# Patient Record
Sex: FEMALE | Race: WHITE | URBAN - METROPOLITAN AREA
[De-identification: names, ages, dates, MRNs, and addresses within clinical notes are randomized per-mention and may not be internally consistent; named-entity substitution may affect disease eponyms.]

---

## 2017-02-02 ENCOUNTER — ANESTHESIA EVENT (OUTPATIENT)
Dept: SURGERY | Facility: CLINIC | Age: 72
DRG: 472 | End: 2017-02-02
Payer: MEDICARE

## 2017-02-02 RX ORDER — HYDROCODONE BITARTRATE AND ACETAMINOPHEN 5; 325 MG/1; MG/1
1 TABLET ORAL EVERY 4 HOURS PRN
COMMUNITY

## 2017-02-02 RX ORDER — ALBUTEROL SULFATE 90 UG/1
1-2 AEROSOL, METERED RESPIRATORY (INHALATION) EVERY 4 HOURS PRN
COMMUNITY

## 2017-02-02 RX ORDER — VERAPAMIL HYDROCHLORIDE 180 MG/1
180 TABLET, EXTENDED RELEASE ORAL DAILY
COMMUNITY

## 2017-02-02 NOTE — PHARMACY-ADMISSION MEDICATION HISTORY
Admission medication history interview status for this patient is complete. See [facility] admission navigator for allergy information, prior to admission medications and immunization status.     PTA med list completed by pre-admitting nurse,  Leticia Silverio RN u Feb 2, 2017  3:10 PM       Prior to Admission medications    Medication Sig Last Dose Taking? Auth Provider   HYDROcodone-acetaminophen (NORCO) 5-325 MG per tablet Take 1 tablet by mouth every 4 hours as needed for moderate to severe pain  Yes Reported, Patient   Naproxen (NAPROSYN PO) Take 500 mg by mouth 2 times daily (with meals)  Yes Reported, Patient   albuterol (PROAIR HFA/PROVENTIL HFA/VENTOLIN HFA) 108 (90 BASE) MCG/ACT Inhaler Inhale 1-2 puffs into the lungs every 4 hours as needed for shortness of breath / dyspnea or wheezing  Yes Reported, Patient   CARVEDILOL PO Take 80 mg by mouth daily  Yes Reported, Patient   Enalapril Maleate (VASOTEC PO) Take 20 mg by mouth 2 times daily  Yes Reported, Patient   sitagliptin-metFORMIN (JANUMET)  MG per tablet Take 1 tablet by mouth 2 times daily (with meals)  Yes Reported, Patient   verapamil (CALAN-SR) 180 MG CR tablet Take 180 mg by mouth daily  Yes Reported, Patient   OMEPRAZOLE PO Take 40 mg by mouth every morning  Yes Reported, Patient   ASPIRIN PO Take 81 mg by mouth daily  Yes Reported, Patient

## 2017-02-03 ENCOUNTER — APPOINTMENT (OUTPATIENT)
Dept: GENERAL RADIOLOGY | Facility: CLINIC | Age: 72
DRG: 472 | End: 2017-02-03
Attending: ORTHOPAEDIC SURGERY
Payer: MEDICARE

## 2017-02-03 ENCOUNTER — HOSPITAL ENCOUNTER (INPATIENT)
Facility: CLINIC | Age: 72
LOS: 3 days | Discharge: HOME | DRG: 472 | End: 2017-02-06
Attending: ORTHOPAEDIC SURGERY | Admitting: ORTHOPAEDIC SURGERY
Payer: MEDICARE

## 2017-02-03 ENCOUNTER — ANESTHESIA (OUTPATIENT)
Dept: SURGERY | Facility: CLINIC | Age: 72
DRG: 472 | End: 2017-02-03
Payer: MEDICARE

## 2017-02-03 DIAGNOSIS — M48.02 CERVICAL STENOSIS OF SPINAL CANAL: Primary | ICD-10-CM

## 2017-02-03 LAB
ABO + RH BLD: NORMAL
ABO + RH BLD: NORMAL
BLD GP AB SCN SERPL QL: NORMAL
BLOOD BANK CMNT PATIENT-IMP: NORMAL
GLUCOSE BLDC GLUCOMTR-MCNC: 117 MG/DL (ref 70–99)
GLUCOSE BLDC GLUCOMTR-MCNC: 145 MG/DL (ref 70–99)
GLUCOSE BLDC GLUCOMTR-MCNC: 155 MG/DL (ref 70–99)
HGB BLD-MCNC: 10.5 G/DL (ref 11.7–15.7)
SPECIMEN EXP DATE BLD: NORMAL

## 2017-02-03 PROCEDURE — 71000014 ZZH RECOVERY PHASE 1 LEVEL 2 FIRST HR: Performed by: ORTHOPAEDIC SURGERY

## 2017-02-03 PROCEDURE — 27810322 ZZHC OR SPINE - CAGE/SPACER/DISK/CORD/CONNECTOR OPNP: Performed by: ORTHOPAEDIC SURGERY

## 2017-02-03 PROCEDURE — 86850 RBC ANTIBODY SCREEN: CPT | Performed by: ORTHOPAEDIC SURGERY

## 2017-02-03 PROCEDURE — 95940 IONM IN OPERATNG ROOM 15 MIN: CPT | Performed by: ORTHOPAEDIC SURGERY

## 2017-02-03 PROCEDURE — 25000125 ZZHC RX 250: Performed by: ORTHOPAEDIC SURGERY

## 2017-02-03 PROCEDURE — 00NW0ZZ RELEASE CERVICAL SPINAL CORD, OPEN APPROACH: ICD-10-PCS | Performed by: ORTHOPAEDIC SURGERY

## 2017-02-03 PROCEDURE — 36000069 ZZH SURGERY LEVEL 5 EA 15 ADDTL MIN: Performed by: ORTHOPAEDIC SURGERY

## 2017-02-03 PROCEDURE — 25000128 H RX IP 250 OP 636: Performed by: NURSE ANESTHETIST, CERTIFIED REGISTERED

## 2017-02-03 PROCEDURE — 00000146 ZZHCL STATISTIC GLUCOSE BY METER IP

## 2017-02-03 PROCEDURE — 95938 SOMATOSENSORY TESTING: CPT | Performed by: ORTHOPAEDIC SURGERY

## 2017-02-03 PROCEDURE — 25000132 ZZH RX MED GY IP 250 OP 250 PS 637: Mod: GY | Performed by: ORTHOPAEDIC SURGERY

## 2017-02-03 PROCEDURE — 25000125 ZZHC RX 250: Performed by: NURSE ANESTHETIST, CERTIFIED REGISTERED

## 2017-02-03 PROCEDURE — 0RT30ZZ RESECTION OF CERVICAL VERTEBRAL DISC, OPEN APPROACH: ICD-10-PCS | Performed by: ORTHOPAEDIC SURGERY

## 2017-02-03 PROCEDURE — 40000306 ZZH STATISTIC PRE PROC ASSESS II: Performed by: ORTHOPAEDIC SURGERY

## 2017-02-03 PROCEDURE — 12000000 ZZH R&B MED SURG/OB

## 2017-02-03 PROCEDURE — 95939 C MOTOR EVOKED UPR&LWR LIMBS: CPT | Performed by: ORTHOPAEDIC SURGERY

## 2017-02-03 PROCEDURE — 25000128 H RX IP 250 OP 636: Performed by: ORTHOPAEDIC SURGERY

## 2017-02-03 PROCEDURE — 25000125 ZZHC RX 250: Performed by: ANESTHESIOLOGY

## 2017-02-03 PROCEDURE — 36415 COLL VENOUS BLD VENIPUNCTURE: CPT | Performed by: ORTHOPAEDIC SURGERY

## 2017-02-03 PROCEDURE — 36000071 ZZH SURGERY LEVEL 5 W FLUORO 1ST 30 MIN: Performed by: ORTHOPAEDIC SURGERY

## 2017-02-03 PROCEDURE — 86900 BLOOD TYPING SEROLOGIC ABO: CPT | Performed by: ORTHOPAEDIC SURGERY

## 2017-02-03 PROCEDURE — 37000009 ZZH ANESTHESIA TECHNICAL FEE, EACH ADDTL 15 MIN: Performed by: ORTHOPAEDIC SURGERY

## 2017-02-03 PROCEDURE — 25800025 ZZH RX 258: Performed by: ANESTHESIOLOGY

## 2017-02-03 PROCEDURE — 71000015 ZZH RECOVERY PHASE 1 LEVEL 2 EA ADDTL HR: Performed by: ORTHOPAEDIC SURGERY

## 2017-02-03 PROCEDURE — 25000566 ZZH SEVOFLURANE, EA 15 MIN: Performed by: ORTHOPAEDIC SURGERY

## 2017-02-03 PROCEDURE — 86901 BLOOD TYPING SEROLOGIC RH(D): CPT | Performed by: ORTHOPAEDIC SURGERY

## 2017-02-03 PROCEDURE — 40000277 XR SURGERY CARM FLUORO LESS THAN 5 MIN W STILLS: Mod: TC

## 2017-02-03 PROCEDURE — 27210995 ZZH RX 272: Performed by: ORTHOPAEDIC SURGERY

## 2017-02-03 PROCEDURE — A9270 NON-COVERED ITEM OR SERVICE: HCPCS | Mod: GY | Performed by: ORTHOPAEDIC SURGERY

## 2017-02-03 PROCEDURE — 25000128 H RX IP 250 OP 636: Performed by: ANESTHESIOLOGY

## 2017-02-03 PROCEDURE — 27210794 ZZH OR GENERAL SUPPLY STERILE: Performed by: ORTHOPAEDIC SURGERY

## 2017-02-03 PROCEDURE — 0RG20A0 FUSION OF 2 OR MORE CERVICAL VERTEBRAL JOINTS WITH INTERBODY FUSION DEVICE, ANTERIOR APPROACH, ANTERIOR COLUMN, OPEN APPROACH: ICD-10-PCS | Performed by: ORTHOPAEDIC SURGERY

## 2017-02-03 PROCEDURE — C1762 CONN TISS, HUMAN(INC FASCIA): HCPCS | Performed by: ORTHOPAEDIC SURGERY

## 2017-02-03 PROCEDURE — 85018 HEMOGLOBIN: CPT | Performed by: ORTHOPAEDIC SURGERY

## 2017-02-03 PROCEDURE — 37000008 ZZH ANESTHESIA TECHNICAL FEE, 1ST 30 MIN: Performed by: ORTHOPAEDIC SURGERY

## 2017-02-03 PROCEDURE — 25800025 ZZH RX 258: Performed by: NURSE ANESTHETIST, CERTIFIED REGISTERED

## 2017-02-03 DEVICE — GRAFT BONE PUTTY DBX 01ML 038010: Type: IMPLANTABLE DEVICE | Site: SPINE CERVICAL | Status: FUNCTIONAL

## 2017-02-03 RX ORDER — NALOXONE HYDROCHLORIDE 0.4 MG/ML
.1-.4 INJECTION, SOLUTION INTRAMUSCULAR; INTRAVENOUS; SUBCUTANEOUS
Status: DISCONTINUED | OUTPATIENT
Start: 2017-02-03 | End: 2017-02-06 | Stop reason: HOSPADM

## 2017-02-03 RX ORDER — BUPIVACAINE HYDROCHLORIDE AND EPINEPHRINE 2.5; 5 MG/ML; UG/ML
INJECTION, SOLUTION EPIDURAL; INFILTRATION; INTRACAUDAL; PERINEURAL PRN
Status: DISCONTINUED | OUTPATIENT
Start: 2017-02-03 | End: 2017-02-03 | Stop reason: HOSPADM

## 2017-02-03 RX ORDER — FENTANYL CITRATE 50 UG/ML
50 INJECTION, SOLUTION INTRAMUSCULAR; INTRAVENOUS ONCE
Status: COMPLETED | OUTPATIENT
Start: 2017-02-03 | End: 2017-02-03

## 2017-02-03 RX ORDER — LIDOCAINE 40 MG/G
CREAM TOPICAL
Status: DISCONTINUED | OUTPATIENT
Start: 2017-02-03 | End: 2017-02-03 | Stop reason: HOSPADM

## 2017-02-03 RX ORDER — GLYCOPYRROLATE 0.2 MG/ML
INJECTION, SOLUTION INTRAMUSCULAR; INTRAVENOUS PRN
Status: DISCONTINUED | OUTPATIENT
Start: 2017-02-03 | End: 2017-02-03

## 2017-02-03 RX ORDER — NEOSTIGMINE METHYLSULFATE 1 MG/ML
VIAL (ML) INJECTION PRN
Status: DISCONTINUED | OUTPATIENT
Start: 2017-02-03 | End: 2017-02-03

## 2017-02-03 RX ORDER — ENALAPRIL MALEATE 10 MG/1
20 TABLET ORAL 2 TIMES DAILY
Status: DISCONTINUED | OUTPATIENT
Start: 2017-02-03 | End: 2017-02-06 | Stop reason: HOSPADM

## 2017-02-03 RX ORDER — OXYCODONE HYDROCHLORIDE 5 MG/1
5-10 TABLET ORAL EVERY 4 HOURS PRN
Status: DISCONTINUED | OUTPATIENT
Start: 2017-02-03 | End: 2017-02-06 | Stop reason: HOSPADM

## 2017-02-03 RX ORDER — ONDANSETRON 4 MG/1
4 TABLET, ORALLY DISINTEGRATING ORAL EVERY 6 HOURS PRN
Status: DISCONTINUED | OUTPATIENT
Start: 2017-02-03 | End: 2017-02-06 | Stop reason: HOSPADM

## 2017-02-03 RX ORDER — CEFAZOLIN SODIUM 2 G/100ML
2 INJECTION, SOLUTION INTRAVENOUS
Status: COMPLETED | OUTPATIENT
Start: 2017-02-03 | End: 2017-02-03

## 2017-02-03 RX ORDER — HYDROMORPHONE HYDROCHLORIDE 1 MG/ML
.3-.5 INJECTION, SOLUTION INTRAMUSCULAR; INTRAVENOUS; SUBCUTANEOUS
Status: DISCONTINUED | OUTPATIENT
Start: 2017-02-03 | End: 2017-02-06 | Stop reason: HOSPADM

## 2017-02-03 RX ORDER — HYDROMORPHONE HYDROCHLORIDE 1 MG/ML
.3-.5 INJECTION, SOLUTION INTRAMUSCULAR; INTRAVENOUS; SUBCUTANEOUS EVERY 10 MIN PRN
Status: DISCONTINUED | OUTPATIENT
Start: 2017-02-03 | End: 2017-02-03 | Stop reason: HOSPADM

## 2017-02-03 RX ORDER — SODIUM CHLORIDE, SODIUM LACTATE, POTASSIUM CHLORIDE, CALCIUM CHLORIDE 600; 310; 30; 20 MG/100ML; MG/100ML; MG/100ML; MG/100ML
INJECTION, SOLUTION INTRAVENOUS CONTINUOUS
Status: DISCONTINUED | OUTPATIENT
Start: 2017-02-03 | End: 2017-02-03 | Stop reason: HOSPADM

## 2017-02-03 RX ORDER — ONDANSETRON 2 MG/ML
4 INJECTION INTRAMUSCULAR; INTRAVENOUS EVERY 6 HOURS PRN
Status: DISCONTINUED | OUTPATIENT
Start: 2017-02-03 | End: 2017-02-06 | Stop reason: HOSPADM

## 2017-02-03 RX ORDER — LIDOCAINE 40 MG/G
CREAM TOPICAL
Status: DISCONTINUED | OUTPATIENT
Start: 2017-02-03 | End: 2017-02-06 | Stop reason: HOSPADM

## 2017-02-03 RX ORDER — EPHEDRINE SULFATE 50 MG/ML
INJECTION, SOLUTION INTRAVENOUS PRN
Status: DISCONTINUED | OUTPATIENT
Start: 2017-02-03 | End: 2017-02-03

## 2017-02-03 RX ORDER — ACETAMINOPHEN 325 MG/1
975 TABLET ORAL EVERY 8 HOURS
Status: DISCONTINUED | OUTPATIENT
Start: 2017-02-04 | End: 2017-02-06 | Stop reason: HOSPADM

## 2017-02-03 RX ORDER — SODIUM CHLORIDE, SODIUM LACTATE, POTASSIUM CHLORIDE, CALCIUM CHLORIDE 600; 310; 30; 20 MG/100ML; MG/100ML; MG/100ML; MG/100ML
INJECTION, SOLUTION INTRAVENOUS CONTINUOUS PRN
Status: DISCONTINUED | OUTPATIENT
Start: 2017-02-03 | End: 2017-02-03

## 2017-02-03 RX ORDER — FENTANYL CITRATE 50 UG/ML
INJECTION, SOLUTION INTRAMUSCULAR; INTRAVENOUS PRN
Status: DISCONTINUED | OUTPATIENT
Start: 2017-02-03 | End: 2017-02-03

## 2017-02-03 RX ORDER — ONDANSETRON 4 MG/1
4 TABLET, ORALLY DISINTEGRATING ORAL EVERY 30 MIN PRN
Status: DISCONTINUED | OUTPATIENT
Start: 2017-02-03 | End: 2017-02-03 | Stop reason: HOSPADM

## 2017-02-03 RX ORDER — ONDANSETRON 2 MG/ML
4 INJECTION INTRAMUSCULAR; INTRAVENOUS EVERY 30 MIN PRN
Status: DISCONTINUED | OUTPATIENT
Start: 2017-02-03 | End: 2017-02-03 | Stop reason: HOSPADM

## 2017-02-03 RX ORDER — NALOXONE HYDROCHLORIDE 0.4 MG/ML
.1-.4 INJECTION, SOLUTION INTRAMUSCULAR; INTRAVENOUS; SUBCUTANEOUS
Status: DISCONTINUED | OUTPATIENT
Start: 2017-02-03 | End: 2017-02-03 | Stop reason: HOSPADM

## 2017-02-03 RX ORDER — PROPOFOL 10 MG/ML
INJECTION, EMULSION INTRAVENOUS CONTINUOUS PRN
Status: DISCONTINUED | OUTPATIENT
Start: 2017-02-03 | End: 2017-02-03

## 2017-02-03 RX ORDER — FENTANYL CITRATE 50 UG/ML
25-50 INJECTION, SOLUTION INTRAMUSCULAR; INTRAVENOUS
Status: DISCONTINUED | OUTPATIENT
Start: 2017-02-03 | End: 2017-02-03 | Stop reason: HOSPADM

## 2017-02-03 RX ORDER — VERAPAMIL HYDROCHLORIDE 180 MG/1
180 TABLET, EXTENDED RELEASE ORAL DAILY
Status: DISCONTINUED | OUTPATIENT
Start: 2017-02-03 | End: 2017-02-06 | Stop reason: HOSPADM

## 2017-02-03 RX ORDER — AMOXICILLIN 250 MG
1-2 CAPSULE ORAL 2 TIMES DAILY
Status: DISCONTINUED | OUTPATIENT
Start: 2017-02-03 | End: 2017-02-06 | Stop reason: HOSPADM

## 2017-02-03 RX ORDER — LIDOCAINE HYDROCHLORIDE 10 MG/ML
INJECTION, SOLUTION INFILTRATION; PERINEURAL PRN
Status: DISCONTINUED | OUTPATIENT
Start: 2017-02-03 | End: 2017-02-03

## 2017-02-03 RX ORDER — LABETALOL HYDROCHLORIDE 5 MG/ML
10 INJECTION, SOLUTION INTRAVENOUS
Status: DISCONTINUED | OUTPATIENT
Start: 2017-02-03 | End: 2017-02-03 | Stop reason: HOSPADM

## 2017-02-03 RX ORDER — MEPERIDINE HYDROCHLORIDE 25 MG/ML
12.5 INJECTION INTRAMUSCULAR; INTRAVENOUS; SUBCUTANEOUS
Status: DISCONTINUED | OUTPATIENT
Start: 2017-02-03 | End: 2017-02-03 | Stop reason: HOSPADM

## 2017-02-03 RX ORDER — ACETAMINOPHEN 325 MG/1
650 TABLET ORAL EVERY 4 HOURS PRN
Status: DISCONTINUED | OUTPATIENT
Start: 2017-02-06 | End: 2017-02-06 | Stop reason: HOSPADM

## 2017-02-03 RX ORDER — ONDANSETRON 2 MG/ML
INJECTION INTRAMUSCULAR; INTRAVENOUS PRN
Status: DISCONTINUED | OUTPATIENT
Start: 2017-02-03 | End: 2017-02-03

## 2017-02-03 RX ORDER — CEFAZOLIN SODIUM 1 G/3ML
1 INJECTION, POWDER, FOR SOLUTION INTRAMUSCULAR; INTRAVENOUS SEE ADMIN INSTRUCTIONS
Status: DISCONTINUED | OUTPATIENT
Start: 2017-02-03 | End: 2017-02-03 | Stop reason: HOSPADM

## 2017-02-03 RX ORDER — ACETAMINOPHEN 10 MG/ML
1000 INJECTION, SOLUTION INTRAVENOUS ONCE
Status: COMPLETED | OUTPATIENT
Start: 2017-02-03 | End: 2017-02-03

## 2017-02-03 RX ORDER — DEXAMETHASONE SODIUM PHOSPHATE 4 MG/ML
6 INJECTION, SOLUTION INTRA-ARTICULAR; INTRALESIONAL; INTRAMUSCULAR; INTRAVENOUS; SOFT TISSUE EVERY 6 HOURS
Status: COMPLETED | OUTPATIENT
Start: 2017-02-03 | End: 2017-02-04

## 2017-02-03 RX ORDER — PROPOFOL 10 MG/ML
INJECTION, EMULSION INTRAVENOUS PRN
Status: DISCONTINUED | OUTPATIENT
Start: 2017-02-03 | End: 2017-02-03

## 2017-02-03 RX ORDER — ASPIRIN 81 MG/1
81 TABLET, CHEWABLE ORAL DAILY
Status: DISCONTINUED | OUTPATIENT
Start: 2017-02-04 | End: 2017-02-06 | Stop reason: HOSPADM

## 2017-02-03 RX ORDER — ALBUTEROL SULFATE 90 UG/1
1-2 AEROSOL, METERED RESPIRATORY (INHALATION) EVERY 4 HOURS PRN
Status: DISCONTINUED | OUTPATIENT
Start: 2017-02-03 | End: 2017-02-06 | Stop reason: HOSPADM

## 2017-02-03 RX ORDER — CARVEDILOL PHOSPHATE 20 MG/1
80 CAPSULE, EXTENDED RELEASE ORAL DAILY
Status: DISCONTINUED | OUTPATIENT
Start: 2017-02-04 | End: 2017-02-06

## 2017-02-03 RX ADMIN — CEFAZOLIN 1 G: 1 INJECTION, POWDER, FOR SOLUTION INTRAMUSCULAR; INTRAVENOUS at 14:45

## 2017-02-03 RX ADMIN — HYDROMORPHONE HYDROCHLORIDE 0.5 MG: 1 INJECTION, SOLUTION INTRAMUSCULAR; INTRAVENOUS; SUBCUTANEOUS at 18:25

## 2017-02-03 RX ADMIN — FENTANYL CITRATE 50 MCG: 50 INJECTION INTRAMUSCULAR; INTRAVENOUS at 17:45

## 2017-02-03 RX ADMIN — FENTANYL CITRATE 50 MCG: 50 INJECTION INTRAMUSCULAR; INTRAVENOUS at 18:05

## 2017-02-03 RX ADMIN — SODIUM CHLORIDE, POTASSIUM CHLORIDE, SODIUM LACTATE AND CALCIUM CHLORIDE: 600; 310; 30; 20 INJECTION, SOLUTION INTRAVENOUS at 13:25

## 2017-02-03 RX ADMIN — HYDROMORPHONE HYDROCHLORIDE 0.5 MG: 1 INJECTION, SOLUTION INTRAMUSCULAR; INTRAVENOUS; SUBCUTANEOUS at 17:48

## 2017-02-03 RX ADMIN — PROPOFOL 75 MCG/KG/MIN: 10 INJECTION, EMULSION INTRAVENOUS at 12:58

## 2017-02-03 RX ADMIN — ENALAPRIL MALEATE 20 MG: 10 TABLET ORAL at 21:19

## 2017-02-03 RX ADMIN — OXYCODONE HYDROCHLORIDE 5 MG: 5 TABLET ORAL at 21:18

## 2017-02-03 RX ADMIN — FENTANYL CITRATE 150 MCG: 50 INJECTION, SOLUTION INTRAMUSCULAR; INTRAVENOUS at 12:52

## 2017-02-03 RX ADMIN — EPHEDRINE SULFATE 7.5 MG: 50 INJECTION, SOLUTION INTRAMUSCULAR; INTRAVENOUS; SUBCUTANEOUS at 13:33

## 2017-02-03 RX ADMIN — SENNOSIDES AND DOCUSATE SODIUM 2 TABLET: 8.6; 5 TABLET ORAL at 21:18

## 2017-02-03 RX ADMIN — Medication 3 MG: at 16:53

## 2017-02-03 RX ADMIN — ACETAMINOPHEN 1000 MG: 10 INJECTION, SOLUTION INTRAVENOUS at 17:50

## 2017-02-03 RX ADMIN — MIDAZOLAM HYDROCHLORIDE 1 MG: 1 INJECTION, SOLUTION INTRAMUSCULAR; INTRAVENOUS at 12:47

## 2017-02-03 RX ADMIN — EPHEDRINE SULFATE 7.5 MG: 50 INJECTION, SOLUTION INTRAMUSCULAR; INTRAVENOUS; SUBCUTANEOUS at 13:13

## 2017-02-03 RX ADMIN — HYDROMORPHONE HYDROCHLORIDE 0.5 MG: 1 INJECTION, SOLUTION INTRAMUSCULAR; INTRAVENOUS; SUBCUTANEOUS at 14:30

## 2017-02-03 RX ADMIN — CEFAZOLIN SODIUM 2 G: 2 INJECTION, SOLUTION INTRAVENOUS at 12:45

## 2017-02-03 RX ADMIN — SODIUM CHLORIDE, POTASSIUM CHLORIDE, SODIUM LACTATE AND CALCIUM CHLORIDE: 600; 310; 30; 20 INJECTION, SOLUTION INTRAVENOUS at 18:00

## 2017-02-03 RX ADMIN — ONDANSETRON 4 MG: 2 INJECTION INTRAMUSCULAR; INTRAVENOUS at 16:57

## 2017-02-03 RX ADMIN — ROCURONIUM BROMIDE 10 MG: 10 INJECTION INTRAVENOUS at 16:15

## 2017-02-03 RX ADMIN — VERAPAMIL HYDROCHLORIDE 180 MG: 180 TABLET, FILM COATED, EXTENDED RELEASE ORAL at 22:01

## 2017-02-03 RX ADMIN — Medication 1 G: at 13:29

## 2017-02-03 RX ADMIN — LIDOCAINE HYDROCHLORIDE 25 MG: 10 INJECTION, SOLUTION INFILTRATION; PERINEURAL at 12:52

## 2017-02-03 RX ADMIN — SODIUM CHLORIDE, POTASSIUM CHLORIDE, SODIUM LACTATE AND CALCIUM CHLORIDE: 600; 310; 30; 20 INJECTION, SOLUTION INTRAVENOUS at 12:45

## 2017-02-03 RX ADMIN — DEXAMETHASONE SODIUM PHOSPHATE 6 MG: 4 INJECTION, SOLUTION INTRA-ARTICULAR; INTRALESIONAL; INTRAMUSCULAR; INTRAVENOUS; SOFT TISSUE at 17:25

## 2017-02-03 RX ADMIN — GLYCOPYRROLATE 0.2 MG: 0.2 INJECTION, SOLUTION INTRAMUSCULAR; INTRAVENOUS at 13:43

## 2017-02-03 RX ADMIN — ROCURONIUM BROMIDE 10 MG: 10 INJECTION INTRAVENOUS at 14:37

## 2017-02-03 RX ADMIN — FENTANYL CITRATE 50 MCG: 50 INJECTION INTRAMUSCULAR; INTRAVENOUS at 10:51

## 2017-02-03 RX ADMIN — SODIUM CHLORIDE, POTASSIUM CHLORIDE, SODIUM LACTATE AND CALCIUM CHLORIDE: 600; 310; 30; 20 INJECTION, SOLUTION INTRAVENOUS at 14:49

## 2017-02-03 RX ADMIN — PROPOFOL 190 MG: 10 INJECTION, EMULSION INTRAVENOUS at 12:52

## 2017-02-03 RX ADMIN — HYDROMORPHONE HYDROCHLORIDE 0.5 MG: 1 INJECTION, SOLUTION INTRAMUSCULAR; INTRAVENOUS; SUBCUTANEOUS at 13:57

## 2017-02-03 RX ADMIN — HYDROMORPHONE HYDROCHLORIDE 0.5 MG: 1 INJECTION, SOLUTION INTRAMUSCULAR; INTRAVENOUS; SUBCUTANEOUS at 14:37

## 2017-02-03 RX ADMIN — ROCURONIUM BROMIDE 40 MG: 10 INJECTION INTRAVENOUS at 13:41

## 2017-02-03 RX ADMIN — PHENYLEPHRINE HYDROCHLORIDE 100 MCG: 10 INJECTION, SOLUTION INTRAMUSCULAR; INTRAVENOUS; SUBCUTANEOUS at 13:52

## 2017-02-03 RX ADMIN — SUCCINYLCHOLINE CHLORIDE 100 MG: 20 INJECTION, SOLUTION INTRAMUSCULAR; INTRAVENOUS at 12:52

## 2017-02-03 RX ADMIN — FENTANYL CITRATE 50 MCG: 50 INJECTION INTRAMUSCULAR; INTRAVENOUS at 11:08

## 2017-02-03 RX ADMIN — CEFAZOLIN SODIUM 1 G: 1 INJECTION, SOLUTION INTRAVENOUS at 22:01

## 2017-02-03 RX ADMIN — GLYCOPYRROLATE 0.4 MG: 0.2 INJECTION, SOLUTION INTRAMUSCULAR; INTRAVENOUS at 16:53

## 2017-02-03 RX ADMIN — CEFAZOLIN 1 G: 1 INJECTION, POWDER, FOR SOLUTION INTRAMUSCULAR; INTRAVENOUS at 16:43

## 2017-02-03 RX ADMIN — HYDROMORPHONE HYDROCHLORIDE 0.5 MG: 1 INJECTION, SOLUTION INTRAMUSCULAR; INTRAVENOUS; SUBCUTANEOUS at 14:10

## 2017-02-03 NOTE — PROGRESS NOTES
"SPIRITUAL HEALTH SERVICES  SPIRITUAL ASSESSMENT Progress Note  [facility]eds [facility]    PRIMARY FOCUS:     Goals of care    Emotional/spiritual/[employer] distress    Support for coping    ILLNESS CIRCUMSTANCES:   Reviewed documentation. Reflective conversation shared with Rupinder and her son which integrated elements of illness and family narratives.     Context of Serious Illness/Symptom(s) - Rupinder is in the hospital for back surgery. She had back surgery 20 years ago, but then a recent accident seemed to have caused some problems.     Persons/Resources Involved - Her family is her primary source of support. She named all four of her children as very supportive.    DISTRESS:     Emotional/Existential/Relational Distress - None expressed    Spiritual/Restoration Distress - none expressed    Social/Cultural/Economic Distress - none expressed    SPIRIT (Coping):     Episcopalian/Steffi - Buddhism and attends a [employer] [place], [place]    Spiritual Practice(s) - Prayer and gladly welcomed prayer    Emotional/Existential/Relational Connections - none expressed.     SENSE-MAKING:    Goals of Care - To spend a few days in the hospital and then to discharge.     Meaning/Sense-Making - \" I just want to get back on my feet and back into the world again    PLAN: [facility] will continue to be available per patient/family/staff request.      Sergio Johansen  Chaplain Resident  Pager [phone]    "

## 2017-02-03 NOTE — IP AVS SNAPSHOT
MRN:[id number]                      After Visit Summary   2/3/2017    Rupinder Uriostegui    MRN: [id number]           Thank you!     Thank you for choosing [facility] for your care. Our goal is always to provide you with excellent care. Hearing back from our patients is one way we can continue to improve our services. Please take a few minutes to complete the written survey that you may receive in the mail after you visit. If you would like to speak to someone directly about your visit please contact Patient Relations at [phone]. Thank you!          Patient Information     Date Of Birth          1945        About your hospital stay     You were admitted on:  February 3, 2017 You last received care in the:  [facility]    You were discharged on:  February 6, 2017        Reason for your hospital stay       Admitted for C4 to C7 anterior cervical decompression and fusion due to cord compression causing myelopathy                  Who to Call     For medical emergencies, please call 911.  For non-urgent questions about your medical care, please call your primary care provider or clinic, [phone]  For questions related to your surgery, please call your surgery clinic        Attending Provider     Provider    Jamie Caldera MD Kim, Jian Craven MD       Primary Care Provider Office Phone # Fax #    Jey Trevizo [phone]       [place] [address]        After Care Instructions     Diet       Follow this diet upon discharge: Regular                  Follow-up Appointments     Follow-up and recommended labs and tests        Follow up with Dr. Caldera , at (location with clinic name or city) [facility] , within 3 weeks   to evaluate after surgery. The following labs/tests are recommended: CT of cervical spine at [facility].                  Further instructions from your care team       Call surgeon if any of these issues  occur:  1) Increased/persistent redness,bleeding, localized warmth and swelling,drainage (yellow/clear/odorous) or tenderness at or incision site. Or incision pulling apart.  2) Increased pain not controlled with oral pain medications.sedation  3) Persistent headache, dizziness, lightheaded   4)Persistent constipation despite taking OTC stool softners as directed   5) calf pain/swollen/hard/warm area,swelling chest pain or shortness of breath  6)increased/persistent numbness or tingling in arms or legs, weakness in extremities or falls  7) Generalized feeling of illness.  8) persistent fever, chills, sweats. Temp >101  9) trouble voiding, incontinence of bowel and/or bladder  10), Difficulty swallowing liquids or foods, feel like things stick in throat. Or coughing with food or drink  11) if unable to wake call 911  Other instructions:  1) change dressing daily or more often for moist drainage,  Change dressing daily or more often if drainage ( call md if doingi through more than one dressing a day)Wash hands before and after changing dressing. Avoid touching incision  2) take teds off 3 times a day for 20 minutes  3)no heavy lifting, nothing more then 6-10lbs. No bending, lifiting or twisting, no sitting for long periods of time. Follow physical therapy restrictions and exercises-Slowly increase  activity  4) avoid sitting or laying in one position too long, walk as tolerated, log roll  5) wear brace at all times, can be removed for showering and for skin inspection. inspect skin under brace daily and call md if sore area starts.  6) take an over the counter stool softener as directed while on narcotics to prevent constipation or to stay regular. Take a suppository or a laxative if no bowel movement in 2 days despite taking stool softener    Diet instructions:  Sit upright in chair for meals or at least 90 degree angle  Moisten mouth before eating or taking pills  Eat slowly and chew food well, alternate food and  "drink  Cut food up small  Eat soft foods, easily swallowed food, small frequent meals      Call to make follow up appointment  1- [phone] with dr jian Nguyễn    Take blood pressure daily and if BP is consistently >140/90 call your primary care md       Pending Results     No orders found from 2017 to 2017.            Admission Information        Provider Department Dept Phone    2/3/2017 Jian Nguyễn MD  Ortho Spine [phone]      Your Vitals Were     Blood Pressure Pulse Temperature    172/88 mmHg 74 97.7  F (36.5  C) (Oral)    Respirations Height Weight    16 1.676 m (5' 6\") 88.905 kg (196 lb)    BMI (Body Mass Index) Pulse Oximetry       31.65 kg/m2 93%       MyChart Information     [employer] lets you send messages to your doctor, view your test results, renew your prescriptions, schedule appointments and more. To sign up, go to [email or web address]/[employer] . Click on \"Log in\" on the left side of the screen, which will take you to the Welcome page. Then click on \"Sign up Now\" on the right side of the page.     You will be asked to enter the access code listed below, as well as some personal information. Please follow the directions to create your username and password.     Your access code is: [email or web address]  Expires: 2017  1:10 PM     Your access code will  in 90 days. If you need help or a new code, please call your [facility] clinic or [phone].        Care EveryWhere ID     This is your Care EveryWhere ID. This could be used by other organizations to access your [facility] medical records  [id number]           Review of your medicines      START taking        Dose / Directions    order for DME   Used for:  Cervical stenosis of spinal canal        Equipment being ordered: Cane (), Walker Wheels () and Walker () Treatment Diagnosis: Impaired gait   Quantity:  1 each   Refills:  0       oxyCODONE 5 MG IR tablet   Commonly known as:  ROXICODONE   Used for:  Cervical stenosis of " spinal canal   Notes to Patient:  Take this or norco similar pain meds          Dose:  5-10 mg   Take 1-2 tablets (5-10 mg) by mouth every 6 hours as needed for moderate to severe pain   Quantity:  30 tablet   Refills:  0         CONTINUE these medicines which have NOT CHANGED        Dose / Directions    albuterol 108 (90 BASE) MCG/ACT Inhaler   Commonly known as:  PROAIR HFA/PROVENTIL HFA/VENTOLIN HFA        Dose:  1-2 puff   Inhale 1-2 puffs into the lungs every 4 hours as needed for shortness of breath / dyspnea or wheezing   Refills:  0       ASPIRIN PO        Dose:  81 mg   Take 81 mg by mouth daily   Refills:  0       CARVEDILOL PO        Dose:  80 mg   Take 80 mg by mouth daily   Refills:  0       HYDROcodone-acetaminophen 5-325 MG per tablet   Commonly known as:  NORCO   Notes to Patient:  Take this or oxycodone not both same medication          Dose:  1 tablet   Take 1 tablet by mouth every 4 hours as needed for moderate to severe pain   Refills:  0       LEVOTHYROXINE SODIUM PO        Refills:  0       OMEPRAZOLE PO        Dose:  40 mg   Take 40 mg by mouth every morning   Refills:  0       sitagliptin-metFORMIN  MG per tablet   Commonly known as:  JANUMET        Dose:  1 tablet   Take 1 tablet by mouth 2 times daily (with meals)   Refills:  0       VASOTEC PO        Dose:  20 mg   Take 20 mg by mouth 2 times daily   Refills:  0       verapamil 180 MG CR tablet   Commonly known as:  CALAN-SR        Dose:  180 mg   Take 180 mg by mouth daily   Refills:  0         STOP taking     NAPROSYN PO                Where to get your medicines      Some of these will need a paper prescription and others can be bought over the counter. Ask your nurse if you have questions.     Bring a paper prescription for each of these medications    - order for DME  - oxyCODONE 5 MG IR tablet             Protect others around you: Learn how to safely use, store and throw away your medicines at www.disposemymeds.org.              Medication List: This is a list of all your medications and when to take them. Check marks below indicate your daily home schedule. Keep this list as a reference.      Medications           Morning Afternoon Evening Bedtime As Needed    albuterol 108 (90 BASE) MCG/ACT Inhaler   Commonly known as:  PROAIR HFA/PROVENTIL HFA/VENTOLIN HFA   Inhale 1-2 puffs into the lungs every 4 hours as needed for shortness of breath / dyspnea or wheezing                                   ASPIRIN PO   Take 81 mg by mouth daily   Last time this was given:  81 mg on 2/6/2017  8:08 AM   Next Dose Due:  Tuesday                                     CARVEDILOL PO   Take 80 mg by mouth daily   Last time this was given:  2/6/2017 12:20 PM   Next Dose Due:  Tuesday                                     HYDROcodone-acetaminophen 5-325 MG per tablet   Commonly known as:  NORCO   Take 1 tablet by mouth every 4 hours as needed for moderate to severe pain   Notes to Patient:  Take this or oxycodone not both same medication                                     LEVOTHYROXINE SODIUM PO   Next Dose Due:  Tuesday                                     OMEPRAZOLE PO   Take 40 mg by mouth every morning   Last time this was given:  40 mg on 2/6/2017  8:08 AM   Next Dose Due:  tuesday                                     order for DME   Equipment being ordered: Cane (), Walker Wheels () and Walker () Treatment Diagnosis: Impaired gait                                oxyCODONE 5 MG IR tablet   Commonly known as:  ROXICODONE   Take 1-2 tablets (5-10 mg) by mouth every 6 hours as needed for moderate to severe pain   Last time this was given:  10 mg on 2/6/2017  1:00 PM   Next Dose Due:  Can have after 5pm  When ready to wean  Take one tab for pain 1-4  2 tabs for pain 5-10     Notes to Patient:  Take this or norco similar pain meds                                     sitagliptin-metFORMIN  MG per tablet   Commonly known as:  JANUMET   Take 1  tablet by mouth 2 times daily (with meals)   Next Dose Due:  mon evening                                        VASOTEC PO   Take 20 mg by mouth 2 times daily   Last time this was given:  20 mg on 2/6/2017  8:08 AM   Next Dose Due:  mon evening                                        verapamil 180 MG CR tablet   Commonly known as:  CALAN-SR   Take 180 mg by mouth daily   Last time this was given:  180 mg on 2/5/2017  7:38 PM   Next Dose Due:  mon evening

## 2017-02-03 NOTE — IP AVS SNAPSHOT
[facility]    [address] [facility] [address]    Phone:  [phone]    Fax:  [phone]                                       After Visit Summary   2/3/2017    Rupinder Uriostegui    MRN: [id number]           After Visit Summary Signature Page     I have received my discharge instructions, and my questions have been answered. I have discussed any challenges I see with this plan with the nurse or doctor.    ..........................................................................................................................................  Patient/Patient Representative Signature      ..........................................................................................................................................  Patient Representative Print Name and Relationship to Patient    ..................................................               ................................................  Date                                            Time    ..........................................................................................................................................  Reviewed by Signature/Title    ...................................................              ..............................................  Date                                                            Time

## 2017-02-03 NOTE — OR NURSING
Family updated. Pillow placed under pt's knees. Second dose of fentanyl given for neck pain.  Cervical collar replaced for pt comfort. Room darkened, pt will try to rest. Oximeter in place.

## 2017-02-03 NOTE — BRIEF OP NOTE
[facility] Brief Operative Note    Pre-operative diagnosis: cervical stenosis   Post-operative diagnosis cervical stenosis C4 to C7 with myelopathy     Procedure: Procedure(s):  C4-C7 Anterior Cervical decompression and fusion  - Wound Class: I-Clean   Surgeon(s): Surgeon(s) and Role:     * Jian Caldera MD - Primary   Estimated blood loss: 20 mL    Specimens: * No specimens in log *   Findings: Spurs and HNP with cord compression

## 2017-02-03 NOTE — ANESTHESIA CARE TRANSFER NOTE
Patient: Rupinder WHITNEY Gapp    Procedure(s):  C4-C7 Anterior Cervical decompression and fusion  - Wound Class: I-Clean    Diagnosis: cervical stenosis  Diagnosis Additional Information: No value filed.    Anesthesia Type:   General, ETT     Note:  Airway :Face Mask  Patient transferred to:PACU  Comments: VSS.      Vitals: (Last set prior to Anesthesia Care Transfer)    CRNA VITALS  2/3/2017 1643 - 2/3/2017 1719      2/3/2017             Pulse: 74    SpO2: 93 %    Resp Rate (observed): (!) 1                Electronically Signed By: JANETTE Jackson CRNA  February 3, 2017  5:19 PM

## 2017-02-03 NOTE — ANESTHESIA PREPROCEDURE EVALUATION
Anesthesia Evaluation     . Pt has had prior anesthetic. Type: General    History of anesthetic complications  - PONV    ROS/MED HX    ENT/Pulmonary:     (+)Intermittent asthma , . .    Neurologic:  - neg neurologic ROS     Cardiovascular:     (+) hypertension----. : . . . :. .       METS/Exercise Tolerance:     Hematologic: Comments: Lab Test        02/03/17                       1025          HGB          10.5*          No lab results found.            Musculoskeletal:   (+) arthritis, , , -       GI/Hepatic:  - neg GI/hepatic ROS       Renal/Genitourinary:  - ROS Renal section negative       Endo:     (+) type II DM .      Psychiatric:  - neg psychiatric ROS       Infectious Disease:  - neg infectious disease ROS       Malignancy:         Other:    - neg other ROS           Physical Exam  Normal systems: cardiovascular, pulmonary and dental    Airway   Mallampati: II  TM distance: >3 FB  Neck ROM: full    Dental     Cardiovascular       Pulmonary                     Anesthesia Plan      History & Physical Review  History and physical reviewed and following examination; no interval change.    ASA Status:  3 .    NPO Status:  > 8 hours    Plan for General and ETT with Intravenous induction. Maintenance will be Balanced.    PONV prophylaxis:  Ondansetron (or other 5HT-3) and Dexamethasone or Solumedrol  Additional equipment: Videolaryngoscope      Postoperative Care  Postoperative pain management:  IV analgesics and Oral pain medications.      Consents  Anesthetic plan, risks, benefits and alternatives discussed with:  Patient..                          .

## 2017-02-04 ENCOUNTER — APPOINTMENT (OUTPATIENT)
Dept: PHYSICAL THERAPY | Facility: CLINIC | Age: 72
DRG: 472 | End: 2017-02-04
Attending: ORTHOPAEDIC SURGERY
Payer: MEDICARE

## 2017-02-04 ENCOUNTER — APPOINTMENT (OUTPATIENT)
Dept: ULTRASOUND IMAGING | Facility: CLINIC | Age: 72
DRG: 472 | End: 2017-02-04
Attending: INTERNAL MEDICINE
Payer: MEDICARE

## 2017-02-04 LAB
CREAT SERPL-MCNC: 0.99 MG/DL (ref 0.52–1.04)
GFR SERPL CREATININE-BSD FRML MDRD: 55 ML/MIN/1.7M2
GLUCOSE BLDC GLUCOMTR-MCNC: 194 MG/DL (ref 70–99)
GLUCOSE BLDC GLUCOMTR-MCNC: 194 MG/DL (ref 70–99)

## 2017-02-04 PROCEDURE — 97161 PT EVAL LOW COMPLEX 20 MIN: CPT | Mod: GP | Performed by: PHYSICAL THERAPIST

## 2017-02-04 PROCEDURE — A9270 NON-COVERED ITEM OR SERVICE: HCPCS | Mod: GY | Performed by: ORTHOPAEDIC SURGERY

## 2017-02-04 PROCEDURE — 82565 ASSAY OF CREATININE: CPT | Performed by: ORTHOPAEDIC SURGERY

## 2017-02-04 PROCEDURE — 25000132 ZZH RX MED GY IP 250 OP 250 PS 637: Mod: GY | Performed by: ORTHOPAEDIC SURGERY

## 2017-02-04 PROCEDURE — 25000131 ZZH RX MED GY IP 250 OP 636 PS 637: Mod: GY | Performed by: INTERNAL MEDICINE

## 2017-02-04 PROCEDURE — 25000128 H RX IP 250 OP 636: Performed by: ORTHOPAEDIC SURGERY

## 2017-02-04 PROCEDURE — 00000146 ZZHCL STATISTIC GLUCOSE BY METER IP

## 2017-02-04 PROCEDURE — 97116 GAIT TRAINING THERAPY: CPT | Mod: GP | Performed by: PHYSICAL THERAPIST

## 2017-02-04 PROCEDURE — 12000000 ZZH R&B MED SURG/OB

## 2017-02-04 PROCEDURE — 25000125 ZZHC RX 250: Performed by: ORTHOPAEDIC SURGERY

## 2017-02-04 PROCEDURE — 93971 EXTREMITY STUDY: CPT | Mod: LT

## 2017-02-04 PROCEDURE — 40000193 ZZH STATISTIC PT WARD VISIT: Performed by: PHYSICAL THERAPIST

## 2017-02-04 PROCEDURE — 99222 1ST HOSP IP/OBS MODERATE 55: CPT | Performed by: INTERNAL MEDICINE

## 2017-02-04 PROCEDURE — 97530 THERAPEUTIC ACTIVITIES: CPT | Mod: GP | Performed by: PHYSICAL THERAPIST

## 2017-02-04 PROCEDURE — 36416 COLLJ CAPILLARY BLOOD SPEC: CPT | Performed by: ORTHOPAEDIC SURGERY

## 2017-02-04 RX ORDER — HYDRALAZINE HYDROCHLORIDE 20 MG/ML
10 INJECTION INTRAMUSCULAR; INTRAVENOUS EVERY 4 HOURS PRN
Status: DISCONTINUED | OUTPATIENT
Start: 2017-02-04 | End: 2017-02-06 | Stop reason: HOSPADM

## 2017-02-04 RX ORDER — DEXTROSE MONOHYDRATE 25 G/50ML
25-50 INJECTION, SOLUTION INTRAVENOUS
Status: DISCONTINUED | OUTPATIENT
Start: 2017-02-04 | End: 2017-02-06 | Stop reason: HOSPADM

## 2017-02-04 RX ORDER — NICOTINE POLACRILEX 4 MG
15-30 LOZENGE BUCCAL
Status: DISCONTINUED | OUTPATIENT
Start: 2017-02-04 | End: 2017-02-06 | Stop reason: HOSPADM

## 2017-02-04 RX ADMIN — SITAGLIPTIN 50 MG: 50 TABLET, FILM COATED ORAL at 17:24

## 2017-02-04 RX ADMIN — INSULIN ASPART 3 UNITS: 100 INJECTION, SOLUTION INTRAVENOUS; SUBCUTANEOUS at 12:11

## 2017-02-04 RX ADMIN — ACETAMINOPHEN 975 MG: 325 TABLET, FILM COATED ORAL at 09:41

## 2017-02-04 RX ADMIN — OMEPRAZOLE 40 MG: 20 CAPSULE, DELAYED RELEASE ORAL at 07:53

## 2017-02-04 RX ADMIN — ACETAMINOPHEN 975 MG: 325 TABLET, FILM COATED ORAL at 02:27

## 2017-02-04 RX ADMIN — ENALAPRIL MALEATE 20 MG: 10 TABLET ORAL at 21:36

## 2017-02-04 RX ADMIN — DEXAMETHASONE SODIUM PHOSPHATE 6 MG: 4 INJECTION, SOLUTION INTRA-ARTICULAR; INTRALESIONAL; INTRAMUSCULAR; INTRAVENOUS; SOFT TISSUE at 00:30

## 2017-02-04 RX ADMIN — ENALAPRIL MALEATE 20 MG: 10 TABLET ORAL at 07:54

## 2017-02-04 RX ADMIN — OXYCODONE HYDROCHLORIDE 5 MG: 5 TABLET ORAL at 07:53

## 2017-02-04 RX ADMIN — OXYCODONE HYDROCHLORIDE 5 MG: 5 TABLET ORAL at 02:44

## 2017-02-04 RX ADMIN — ACETAMINOPHEN 975 MG: 325 TABLET, FILM COATED ORAL at 17:24

## 2017-02-04 RX ADMIN — SENNOSIDES AND DOCUSATE SODIUM 2 TABLET: 8.6; 5 TABLET ORAL at 21:36

## 2017-02-04 RX ADMIN — OXYCODONE HYDROCHLORIDE 5 MG: 5 TABLET ORAL at 14:27

## 2017-02-04 RX ADMIN — OXYCODONE HYDROCHLORIDE 5 MG: 5 TABLET ORAL at 11:02

## 2017-02-04 RX ADMIN — CARVEDILOL PHOSPHATE 80 MG: 20 CAPSULE, EXTENDED RELEASE ORAL at 09:08

## 2017-02-04 RX ADMIN — DEXAMETHASONE SODIUM PHOSPHATE 6 MG: 4 INJECTION, SOLUTION INTRA-ARTICULAR; INTRALESIONAL; INTRAMUSCULAR; INTRAVENOUS; SOFT TISSUE at 06:46

## 2017-02-04 RX ADMIN — INSULIN ASPART 2 UNITS: 100 INJECTION, SOLUTION INTRAVENOUS; SUBCUTANEOUS at 17:25

## 2017-02-04 RX ADMIN — METFORMIN HYDROCHLORIDE 1000 MG: 500 TABLET ORAL at 17:24

## 2017-02-04 RX ADMIN — SITAGLIPTIN 50 MG: 50 TABLET, FILM COATED ORAL at 09:41

## 2017-02-04 RX ADMIN — Medication 2 LOZENGE: at 14:27

## 2017-02-04 RX ADMIN — METFORMIN HYDROCHLORIDE 1000 MG: 500 TABLET ORAL at 09:41

## 2017-02-04 RX ADMIN — OXYCODONE HYDROCHLORIDE 5 MG: 5 TABLET ORAL at 18:49

## 2017-02-04 RX ADMIN — OXYCODONE HYDROCHLORIDE 5 MG: 5 TABLET ORAL at 04:45

## 2017-02-04 RX ADMIN — CEFAZOLIN SODIUM 1 G: 1 INJECTION, SOLUTION INTRAVENOUS at 04:33

## 2017-02-04 RX ADMIN — VERAPAMIL HYDROCHLORIDE 180 MG: 180 TABLET, FILM COATED, EXTENDED RELEASE ORAL at 21:36

## 2017-02-04 NOTE — ANESTHESIA POSTPROCEDURE EVALUATION
Patient: Rupinder WHITNEY Gapgemma    Procedure(s):  C4-C7 Anterior Cervical decompression and fusion  - Wound Class: I-Clean    Diagnosis:cervical stenosis  Diagnosis Additional Information:  Cervical stenosis C4-5, 5-6, 6-7 with cervical myelopathy.      Anesthesia Type:  General, ETT    Note:  Anesthesia Post Evaluation    Patient location during evaluation: PACU  Patient participation: Able to fully participate in evaluation  Level of consciousness: awake  Pain management: adequate  Airway patency: patent  Cardiovascular status: acceptable  Respiratory status: acceptable  Hydration status: acceptable  PONV: controlled     Anesthetic complications: None          Last vitals:  Filed Vitals:    02/03/17 1930 02/03/17 2020 02/03/17 2119   BP: 146/70 158/72 159/77   Pulse:      Temp: 96.6  F (35.9  C) 97.5  F (36.4  C)    Resp: 12 12 12   SpO2: 99% 97% 99%         Electronically Signed By: Ameya Hassan DO  February 3, 2017  9:38 PM

## 2017-02-04 NOTE — PLAN OF CARE
Problem: Goal Outcome Summary  Goal: Goal Outcome Summary  PT: Orders received. PT evaluation completed and treatment initiated. Pt lives in multilevel home at baseline. Pt plans to discharge to her sons home so that she can have some assist and to reduce the number of stairs she'll need to complete.     Surgeon Discharge Plan: Likely home tomorrow    Current Functional Status: Pt able to complete sit<>supine with SBA and cues for technique. Pt able to ambulate with use of FWW and CGA x 60'. Pt able to complete sit<>stand with CGA and cues for safety. Pt fatigues easily with session, returning to bed upon completion. Pt on RA throughout session with O2 sats remaining above 92%.     Barriers to Plan/Home: Stairs. Pt is unsure how much her son/son's family will be able to assist at home.

## 2017-02-04 NOTE — PLAN OF CARE
"Problem: Goal Outcome Summary  Goal: Goal Outcome Summary  PT:    Surgeon Discharge Plan: Home    Current Functional Status: Pt able to complete sit<>supine with SBA and increased time. Pt completes sit<>stand with SBA. Pt ambulates 100' with use of FWW and CGA. Pt requires one standing rest break.     Barriers to Plan/Home: Tolerating little actvity at this time. Anticipate this will continue to improve.     Pt complains of calf pain with compression devices on. Removed compression and examined calf briefly. Pt with pain when calf is squeezed. Pt describes the pain similar to a \"blood clot\".  Additionally, pt with complaints of reflux and difficulty swallowing. Informed nursing of all complaints.   "

## 2017-02-04 NOTE — PLAN OF CARE
Problem: Goal Outcome Summary  Goal: Goal Outcome Summary  A/O, VSS pain 7/10 oxycodone for pain control.  Feet numb baseline viveros catheter dcd due to void.  Collar on dressing cdi. Tolerated clears complains of some difficulty swallowing.  On IV decadron

## 2017-02-04 NOTE — PLAN OF CARE
Problem: Goal Outcome Summary  Goal: Goal Outcome Summary  Outcome: No Change  Pt admitted at 1930 accompanied by son, Mat. Pt still drowsy from PACU. On 2 lpm NC. Neck brace on. CMS intact.DM2.hx of back surgery. Baseline left side weakness. Left arm ROM impaired d/t pain on abduction.VS stable. No numbness or tingling. Pain at 7-8/10, gave 5mg oxy. periph IV on left foot and left AC d/t hard stick. Fluids running. Hob can only be at 30 degrees. Allergy band on.

## 2017-02-04 NOTE — PROGRESS NOTES
02/04/17 0852   Quick Adds   Type of Visit Initial PT Evaluation   Living Environment   Lives With alone  (will be staying with son upon DC)   Living Arrangements house   Number of Stairs to Enter Home 2   Number of Stairs Within Home 12   Stair Railings at Home inside, present on left side   Transportation Available family or friend will provide   Self-Care   Dominant Hand right   Usual Activity Tolerance good   Current Activity Tolerance fair   Regular Exercise no   Equipment Currently Used at Home none   Functional Level Prior   Ambulation 0-->independent   Transferring 0-->independent   Toileting 0-->independent   Bathing 0-->independent   Dressing 0-->independent   Eating 0-->independent   Communication 0-->understands/communicates without difficulty   Swallowing 0-->swallows foods/liquids without difficulty   Cognition 0 - no cognition issues reported   Fall history within last six months no   Which of the above functional risks had a recent onset or change? ambulation;transferring   General Information   Onset of Illness/Injury or Date of Surgery - Date 02/03/17   Referring Physician Jian Caldera MD   Patient/Family Goals Statement Return home upon DC   Pertinent History of Current Problem (include personal factors and/or comorbidities that impact the POC) Rupinder Uriostegui is a 71-year-old woman with a 6-month history of progressive myelopathy paresis electric shocks with a severe cord compression with myelomalacia evident on the preoperative MRI scan. Pt also with PMH of HTN, diabetes, high cholesterol, and arthritis. high complexity   Precautions/Limitations spinal precautions  (cervical)   Weight-Bearing Status - LUE full weight-bearing   Weight-Bearing Status - RUE full weight-bearing   Weight-Bearing Status - LLE full weight-bearing   Weight-Bearing Status - RLE full weight-bearing   Cognitive Status Examination   Orientation orientation to person, place and time   Level of Consciousness alert  "  Follows Commands and Answers Questions 100% of the time   Personal Safety and Judgment intact   Memory intact   Cognitive Comment Occasionally has difficulty recalling memories secondary to post op meds.    Pain Assessment   Patient Currently in Pain Yes, see Vital Sign flowsheet   Integumentary/Edema   Integumentary/Edema Comments Pt with dressing to cervical spine. Appears clean and dry   Posture    Posture Protracted shoulders   Range of Motion (ROM)   ROM Comment Cervical ROM limited secondary to precautions. All other WNL   Strength   Strength Comments WNL   Bed Mobility   Bed Mobility Comments sit<>supine with SBA   Transfer Skills   Transfer Comments sit<>stand CGA   Gait   Gait Comments Ambulation with FWW and CGA   Balance   Balance Comments Able to stand with wide base of support and no UE support.    Sensory Examination   Sensory Perception Comments Numbness and tingling to L UE and LE. This is pt's baseline.    General Therapy Interventions   Planned Therapy Interventions bed mobility training;gait training;strengthening;transfer training;home program guidelines   Clinical Impression   Criteria for Skilled Therapeutic Intervention yes, treatment indicated   PT Diagnosis Impaired mobility   Influenced by the following impairments Weakness, pain, fatigue   Functional limitations due to impairments Difficulty with bed mobility, transfers, ambulation, stairs.    Clinical Presentation Stable/Uncomplicated   Clinical Presentation Rationale Pt is following typical post-op expectations.    Clinical Decision Making (Complexity) Low complexity   Therapy Frequency` 2 times/day   Predicted Duration of Therapy Intervention (days/wks) 2 days   Anticipated Equipment Needs at Discharge walker   Anticipated Discharge Disposition Home with Assist   Risk & Benefits of therapy have been explained Yes   Patient, Family & other staff in agreement with plan of care Yes   [facility] AM-PAC TM \"6 Clicks\"   2016, " "Trustees of [facility], under license to [employer].  All rights reserved.   6 Clicks Short Forms Basic Mobility Inpatient Short Form   [facility] AM-PAC  \"6 Clicks\" V.2 Basic Mobility Inpatient Short Form   1. Turning from your back to your side while in a flat bed without using bedrails? 4 - None   2. Moving from lying on your back to sitting on the side of a flat bed without using bedrails? 3 - A Little   3. Moving to and from a bed to a chair (including a wheelchair)? 3 - A Little   4. Standing up from a chair using your arms (e.g., wheelchair, or bedside chair)? 3 - A Little   5. To walk in hospital room? 3 - A Little   6. Climbing 3-5 steps with a railing? 3 - A Little   Basic Mobility Raw Score (Score out of 24.Lower scores equate to lower levels of function) 19   Total Evaluation Time   Total Evaluation Time (Minutes) 14     "

## 2017-02-04 NOTE — OP NOTE
DATE OF SURGERY:  2/3/2017.        PREOPERATIVE DIAGNOSIS:  Cervical stenosis C4-5, 5-6, 6-7 with cervical myelopathy.      POSTOPERATIVE DIAGNOSIS:  Same.      SURGEON:  Jian Caldera MD.      FIRST ASSISTANT:  Monico Molina CNP.       PROCEDURES PERFORMED:   1.  Anterior cervical decompression fusion C4-5, 5-6, 6-7.   2.  L&K Biomet PEEK cages 8 mm at C4-5; 9 mm at C5-6 and C6-7.   3.  Anterior instrumentation C4 through C7, LNK Biomet plate screws.      ESTIMATED BLOOD LOSS:  20 cc.      TIME:  Approximately 2-1/2 hours.      COMPLICATIONS:  None.      INDICATIONS:  Rupinder Uriostegui is a 71-year-old woman with a 6-month history of progressive myelopathy paresis electric shocks with a severe cord compression with myelomalacia evident on the preoperative MRI scan.  Given this situation urgent anterior cervical decompression fusion was recommended, the nature of surgery, risks and alternatives were discussed, the patient opted to proceed.  The entire procedure was carried out under SSEP and MEP monitoring nature, procedure, risks and alternatives were discussed.      PROCEDURE:  The patient was brought to the operating theater.  General endotracheal anesthesia was induced in an uneventful manner.  A timeout was called, prophylactic IV antibiotic was given, Rojas catheter was inserted, neck was kept in neutral position, shoulders taped down, lateral C-arm fluoroscopy was brought in, appropriate level of incision was marked off on the left side of the cervical spine.  After routine prep and drape a left-sided transverse incision was made, skin was incised, subcutaneous tissue was incised.  Blunt dissection was performed anterior aspect of the cervical spine was exposed.  Spinal needle was inserted to confirm the correct level of dissection.      At this time Longus colli muscles were subperiosteally elevated from anterior aspect of cervical spine.      C4-5 interspace was worked on first.  Another lateral C-arm  fluoroscopy confirmed correct level; complete diskectomy was performed at C4-C5, all the disk material was removed down to the PLL.  PLL was taken down, dura was exposed, generous central and bilateral foraminal decompression was then carried out using 2 mm Kerrison punch, endplates were then shaped into rectangular parallel plate, distraction pin was inserted, interspace distracted and LNK Biomet PEEK cage 8 mm height packed with DBX was then impacted into the interspace; it was countersunk 2 mm.  Attention then turned to the C5-C6 level; large anterior spur was resected, disk space was entered, all the disk material was removed down to the PLL, the PLL was taken down, dura was exposed again generous bilateral foraminotomies were carried out.  Distraction pin was inserted and 9 mm height LNK Biomet PEEK cage packed with DBX was then impacted distracting the disk space, this was countersunk as well.  Finally large anterior spur in front of the C6-C7 disc space was resected using high speed drill, disk space was entered and disk material was removed, the large posterior spur was also resected piecemeal by a 1 and 2 mm Kerrison punch after thinning down using high speed bur.  Dura was exposed, bilateral generous foraminotomies were then carried out, distraction pin was inserted and LNK Biomet PEEK cage 9 mm height packed with DBX, DBM was then impacted into the interspace; was countersunk distracting the interspace further decompressing the canal.  At this time 3-level 67 mm LNK Biomet titanium anterior cervical plate was then chosen, was applied to the anterior aspect of cervical spine using combination of 16 and 18 mm screws.  Each screw had excellent purchase at the purchase site, locking nut was tightened down to prevent screw back out.  Final lateral C-arm fluoroscopy showed good positioning of the plate, screws and the cages.  Bleeding was minimal, wound was irrigated with saline; one Hemovac was brought out  from deep wound.  Subcutaneous tissue reapproximated using interrupted 2-0 Vicryl sutures and skin was reapproximated using running 4-0 Vicryl sutures.  Steri-Strips applied.  Sterile dressing was applied.  The patient was turned back into supine position.  The patient was extubated and was taken to the recovery room in good condition.  The patient tolerated the procedure well.         ANDREE SY MD             D: 2017 17:14   T: 2017 19:37   MT: #[id number]      Name:     HARIKA VELAZQUEZ   MRN:      [id number]        Account:        [id number]   :      1945           Procedure Date: 2017      Document: [id number]

## 2017-02-04 NOTE — PLAN OF CARE
Problem: Goal Outcome Summary  Goal: Goal Outcome Summary  Outcome: Improving  Son to bring coreg in morning d/t pharmacy not having any.

## 2017-02-04 NOTE — PROGRESS NOTES
"SPIRITUAL HEALTH SERVICES  SPIRITUAL ASSESSMENT Progress Note  Formerly [facility]    PRIMARY FOCUS:     Goals of care    Symptom/pain management    Emotional/spiritual/Restorationist distress    Support for coping    ILLNESS CIRCUMSTANCES:   Reviewed documentation. Reflective conversation shared with Savannah which integrated elements of illness and family narratives.     Context of Serious Illness/Symptom(s) - Savannah had back surgery yesterday to correct an almost year-long bout of back problems.    Persons/Resources Involved - Her children are her primary source of support. One lives locally, the other in [place] (Savannah lives with him) and her daughter is in [place]     DISTRESS:     Emotional/Existential/Relational Distress -   o   20 years ago. Savannah shared that now he is an davina and has sent signs to her and her daughter.  o Brother  a few days ago and  is Monday. Savannah won't be able to go.    Spiritual/Baptist Distress - none expressed    Social/Cultural/Economic Distress - none expressed    SPIRIT (Coping):     Jehovah's witness/Steffi -  o Jain  o Shared stories of God putting people in her life just when she needed them  o \"We're a very spiritual family, we take our detours, but we really are.\"    Spiritual Practice(s) -   o Prayer and gladly welcomed prayer.   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    Emotional/Existential/Relational Connections -     SENSE-MAKING:    Goals of Care - To be discharged tomorrow.    Meaning/Sense-Making - \"Jovon is got me in his hand. God always takes good care of me.\"    PLAN: [facility] will continue to be available per patient/family/staff request      Sergio Johansen  Chaplain Resident  Pager [phone]  "

## 2017-02-04 NOTE — PROGRESS NOTES
Bilateral shoulder pain and swallowing difficulty.   Requires at least another day of hospitalization until these  Are resolved.  Unsafe to discharge today.

## 2017-02-04 NOTE — PLAN OF CARE
Problem: Goal Outcome Summary  Goal: Goal Outcome Summary  OT-Orders received, per PT, patient does not have OT needs. Will discontinue OT orders and defer further recommendations to PT.

## 2017-02-04 NOTE — PROGRESS NOTES
NUTRITION ASSESSMENT      REASON FOR NUTRITION CONSULT:  Positive nutrition risk screen - unintentional weight loss of 10# or more in last 2 months (since pain became severe)    ASSESSMENT:  Unable to complete nutrition assessment due to patient unavailability - with nursing, therapy x 2 attempts to see    SLP eval pending, tolerated soft foods at lunch per RN report. Mod CC diet with DM hx    No wt trends documented per EMR review    FOLLOW UP:   Glucerna prn until intake and swallow function determined to be adequate. Will follow up as schedule allows 2/6 if remains inpatient (writer off site, on call 2/5)       MILES Nicholson  3rd floor/ICU: [phone]  All other floors: [phone]  Weekend/holiday: [phone]  Office: [phone]

## 2017-02-04 NOTE — CONSULTS
HOSPITALIST CONSULTATION      REQUESTING PHYSICIAN:  Dr. Jian Caldera.      REASON FOR CONSULTATION:  Postoperative management.      HISTORY OF HOSPITALIZATION:  Rupinder Uriostegui is a 71-year-old female who has undergone anterior cervical decompression and fusion of C4 through C7.  The patient currently is having some pain in her neck, also having a little bit of difficulty where she feels that swallowing is having a little trouble, mostly some burning sensation in her throat when she swallows, does not feel short of breath, has not had any nausea, not passing gas yet since surgery.  No other complaints at this time.      PAST MEDICAL HISTORY:  Significant for:    1.  Hypertension.   2.  Hyperlipidemia.   3.  Asthma.   4.  Chronic diarrhea.   5.  Diabetes mellitus type 2.   6.  Chronic pain syndrome.   7.  Chronic arthritis.      ALLERGIES:  The patient is allergic to adhesive tape, statin medications and sulfa drugs.      MEDICATIONS:  Prior to hospitalization, the patient has been takin.  Norco as needed for pain.   2.  Naproxen as needed for pain.   3.  Carvedilol controlled release 80 mg a day.   4.  Enalapril 20 mg twice a day.   5.  Janumet  mg twice a day.   6.  Verapamil sustained release 180 mg a day.   7.  Omeprazole 40 mg a day.   8.  Aspirin 81 mg a day.   9.  Albuterol as needed for shortness of breath.   10.  Synthroid, unknown dose once a day.      SOCIAL HISTORY:  The patient does not smoke, rarely drinks alcohol.      FAMILY HISTORY:  Father with leukemia.  No coronary artery disease in the immediate family that the patient knows of.      REVIEW OF SYSTEMS:  Positive for neck pain, burning sensation in her throat with swallowing.  Otherwise, a 10-point review of systems is negative for acute problems.      PHYSICAL EXAMINATION:   VITAL SIGNS:  Today show a temperature of 98.8, heart rate 82, blood pressure 129/82, respiratory rate 16, oxygen saturation 97%.   IN GENERAL:  The patient is  well-developed, well-nourished, no acute distress, awake, alert and oriented x3.   HEENT:  Head is normocephalic, atraumatic.  Eyes:  Pupils equal, round, reactive to light.  Extraocular muscles intact.  Sclerae are nonicteric.  Oropharynx has moist mucous membranes, no lesions.   NECK:  Has a neck brace on at this time, which was not removed.   HEART:  Regular, no murmur.   LUNGS:  Clear to auscultation bilaterally.  The patient is using normal respiratory effort to breathe, no accessory muscle use.   ABDOMEN:  Has positive bowel sounds, soft, nontender to palpation.   SKIN:  Warm and dry to touch.  No rash over examined skin.   EXTREMITIES:  No pitting edema in the lower extremities, no cyanosis.  The patient does have pneumatic compression devices on at this time.   NEUROLOGIC:  Cranial nerves were evaluated, other than not removing the patient's neck brace; they appear to be intact bilaterally.  The patient moves her arms without problems bilaterally.   PSYCHIATRIC:  The patient has appropriate affect, oriented to person, place and time.      LABORATORY TESTING:  Creatinine today is 0.99, glucose 194.      ASSESSMENT AND PLAN:  A 71-year-old female status post cervical spine surgery.   1.  Cervical spine surgery.  The patient does need to use incentive spirometer, needs pain medications as needed, and needs to work with therapy.   2.  Deep venous thrombosis prophylaxis.  Again the patient does have pneumatic compression devices on at this time which should be continued for deep venous thrombosis prophylaxis.   3.  Diabetes.  Continue the patient's Janumet.  Also add a NovoLog sliding scale.  Currently blood sugars are on the high side, likely due to surgery and dexamethasone that she has been receiving.   4.  Hypertension.  Continue the patient on her enalapril, Coreg and her verapamil.  We will also have hydralazine available as needed.   5.  Gastroesophageal reflux disease.  Continue omeprazole.    6.   Hypothyroidism.  We will need to have pharmacy verify the patient's home dose of Synthroid and once verified could be restarted.      Thank you very much for this consult.         MEÑO JAIMES DO             D: 2017 11:10   T: 2017 13:20   MT: REJI#[id number]      Name:     HARIKA VELAZQUEZ   MRN:      [id number]        Account:       [id number]   :      1945           Consult Date:  2017      Document: [id number]       cc: Jian Caldera MD

## 2017-02-04 NOTE — PLAN OF CARE
Problem: Goal Outcome Summary  Goal: Goal Outcome Summary  Outcome: Improving  Pt was able to dangle at bedside, stand and take a few steps to the side with assist of 3 using gait belt and walker. Tolerated well.

## 2017-02-05 ENCOUNTER — APPOINTMENT (OUTPATIENT)
Dept: PHYSICAL THERAPY | Facility: CLINIC | Age: 72
DRG: 472 | End: 2017-02-05
Attending: ORTHOPAEDIC SURGERY
Payer: MEDICARE

## 2017-02-05 ENCOUNTER — APPOINTMENT (OUTPATIENT)
Dept: SPEECH THERAPY | Facility: CLINIC | Age: 72
DRG: 472 | End: 2017-02-05
Attending: ORTHOPAEDIC SURGERY
Payer: MEDICARE

## 2017-02-05 LAB
ANION GAP SERPL CALCULATED.3IONS-SCNC: 12 MMOL/L (ref 3–14)
BUN SERPL-MCNC: 23 MG/DL (ref 7–30)
CALCIUM SERPL-MCNC: 8.7 MG/DL (ref 8.5–10.1)
CHLORIDE SERPL-SCNC: 114 MMOL/L (ref 94–109)
CO2 SERPL-SCNC: 19 MMOL/L (ref 20–32)
CREAT SERPL-MCNC: 0.97 MG/DL (ref 0.52–1.04)
ERYTHROCYTE [DISTWIDTH] IN BLOOD BY AUTOMATED COUNT: 12.9 % (ref 10–15)
GFR SERPL CREATININE-BSD FRML MDRD: 57 ML/MIN/1.7M2
GLUCOSE BLDC GLUCOMTR-MCNC: 115 MG/DL (ref 70–99)
GLUCOSE BLDC GLUCOMTR-MCNC: 141 MG/DL (ref 70–99)
GLUCOSE BLDC GLUCOMTR-MCNC: 168 MG/DL (ref 70–99)
GLUCOSE BLDC GLUCOMTR-MCNC: 172 MG/DL (ref 70–99)
GLUCOSE BLDC GLUCOMTR-MCNC: 179 MG/DL (ref 70–99)
GLUCOSE BLDC GLUCOMTR-MCNC: 189 MG/DL (ref 70–99)
GLUCOSE BLDC GLUCOMTR-MCNC: 234 MG/DL (ref 70–99)
GLUCOSE BLDC GLUCOMTR-MCNC: 253 MG/DL (ref 70–99)
GLUCOSE SERPL-MCNC: 162 MG/DL (ref 70–99)
HBA1C MFR BLD: 6.7 % (ref 4.3–6)
HCT VFR BLD AUTO: 28.2 % (ref 35–47)
HGB BLD-MCNC: 9.4 G/DL (ref 11.7–15.7)
MCH RBC QN AUTO: 30.6 PG (ref 26.5–33)
MCHC RBC AUTO-ENTMCNC: 33.3 G/DL (ref 31.5–36.5)
MCV RBC AUTO: 92 FL (ref 78–100)
PLATELET # BLD AUTO: 295 10E9/L (ref 150–450)
POTASSIUM SERPL-SCNC: 4.3 MMOL/L (ref 3.4–5.3)
RBC # BLD AUTO: 3.07 10E12/L (ref 3.8–5.2)
SODIUM SERPL-SCNC: 145 MMOL/L (ref 133–144)
WBC # BLD AUTO: 14.5 10E9/L (ref 4–11)

## 2017-02-05 PROCEDURE — 40000225 ZZH STATISTIC SLP WARD VISIT: Performed by: SPEECH-LANGUAGE PATHOLOGIST

## 2017-02-05 PROCEDURE — 25000132 ZZH RX MED GY IP 250 OP 250 PS 637: Mod: GY | Performed by: ORTHOPAEDIC SURGERY

## 2017-02-05 PROCEDURE — 00000146 ZZHCL STATISTIC GLUCOSE BY METER IP

## 2017-02-05 PROCEDURE — 80048 BASIC METABOLIC PNL TOTAL CA: CPT | Performed by: INTERNAL MEDICINE

## 2017-02-05 PROCEDURE — 85027 COMPLETE CBC AUTOMATED: CPT | Performed by: INTERNAL MEDICINE

## 2017-02-05 PROCEDURE — 25000125 ZZHC RX 250: Performed by: INTERNAL MEDICINE

## 2017-02-05 PROCEDURE — A9270 NON-COVERED ITEM OR SERVICE: HCPCS | Mod: GY | Performed by: INTERNAL MEDICINE

## 2017-02-05 PROCEDURE — 36416 COLLJ CAPILLARY BLOOD SPEC: CPT | Performed by: INTERNAL MEDICINE

## 2017-02-05 PROCEDURE — 92610 EVALUATE SWALLOWING FUNCTION: CPT | Mod: GN | Performed by: SPEECH-LANGUAGE PATHOLOGIST

## 2017-02-05 PROCEDURE — 40000193 ZZH STATISTIC PT WARD VISIT: Performed by: PHYSICAL THERAPIST

## 2017-02-05 PROCEDURE — 97116 GAIT TRAINING THERAPY: CPT | Mod: GP | Performed by: PHYSICAL THERAPIST

## 2017-02-05 PROCEDURE — 12000000 ZZH R&B MED SURG/OB

## 2017-02-05 PROCEDURE — A9270 NON-COVERED ITEM OR SERVICE: HCPCS | Mod: GY | Performed by: ORTHOPAEDIC SURGERY

## 2017-02-05 PROCEDURE — 97530 THERAPEUTIC ACTIVITIES: CPT | Mod: GP | Performed by: PHYSICAL THERAPIST

## 2017-02-05 PROCEDURE — 83036 HEMOGLOBIN GLYCOSYLATED A1C: CPT | Performed by: INTERNAL MEDICINE

## 2017-02-05 PROCEDURE — 25000132 ZZH RX MED GY IP 250 OP 250 PS 637: Mod: GY | Performed by: INTERNAL MEDICINE

## 2017-02-05 PROCEDURE — 99232 SBSQ HOSP IP/OBS MODERATE 35: CPT | Performed by: INTERNAL MEDICINE

## 2017-02-05 RX ORDER — HYDROCHLOROTHIAZIDE 12.5 MG/1
25 CAPSULE ORAL DAILY
Status: DISCONTINUED | OUTPATIENT
Start: 2017-02-05 | End: 2017-02-06 | Stop reason: HOSPADM

## 2017-02-05 RX ORDER — LOPERAMIDE HCL 2 MG
2 CAPSULE ORAL ONCE
Status: COMPLETED | OUTPATIENT
Start: 2017-02-05 | End: 2017-02-05

## 2017-02-05 RX ORDER — OXYCODONE HYDROCHLORIDE 5 MG/1
5-10 TABLET ORAL EVERY 6 HOURS PRN
Qty: 30 TABLET | Refills: 0 | Status: SHIPPED | OUTPATIENT
Start: 2017-02-05

## 2017-02-05 RX ADMIN — ENALAPRIL MALEATE 20 MG: 10 TABLET ORAL at 08:01

## 2017-02-05 RX ADMIN — METFORMIN HYDROCHLORIDE 1000 MG: 500 TABLET ORAL at 08:01

## 2017-02-05 RX ADMIN — ACETAMINOPHEN 975 MG: 325 TABLET, FILM COATED ORAL at 10:29

## 2017-02-05 RX ADMIN — OXYCODONE HYDROCHLORIDE 5 MG: 5 TABLET ORAL at 01:26

## 2017-02-05 RX ADMIN — ACETAMINOPHEN 975 MG: 325 TABLET, FILM COATED ORAL at 17:19

## 2017-02-05 RX ADMIN — OXYCODONE HYDROCHLORIDE 5 MG: 5 TABLET ORAL at 06:26

## 2017-02-05 RX ADMIN — INSULIN ASPART 1 UNITS: 100 INJECTION, SOLUTION INTRAVENOUS; SUBCUTANEOUS at 12:35

## 2017-02-05 RX ADMIN — OMEPRAZOLE 40 MG: 20 CAPSULE, DELAYED RELEASE ORAL at 08:01

## 2017-02-05 RX ADMIN — CARVEDILOL PHOSPHATE 80 MG: 20 CAPSULE, EXTENDED RELEASE ORAL at 10:31

## 2017-02-05 RX ADMIN — VERAPAMIL HYDROCHLORIDE 180 MG: 180 TABLET, FILM COATED, EXTENDED RELEASE ORAL at 19:38

## 2017-02-05 RX ADMIN — ACETAMINOPHEN 975 MG: 325 TABLET, FILM COATED ORAL at 01:26

## 2017-02-05 RX ADMIN — HYDROCHLOROTHIAZIDE 25 MG: 12.5 CAPSULE ORAL at 11:54

## 2017-02-05 RX ADMIN — HYDRALAZINE HYDROCHLORIDE 10 MG: 20 INJECTION INTRAMUSCULAR; INTRAVENOUS at 06:26

## 2017-02-05 RX ADMIN — SITAGLIPTIN 50 MG: 50 TABLET, FILM COATED ORAL at 08:02

## 2017-02-05 RX ADMIN — SITAGLIPTIN 50 MG: 50 TABLET, FILM COATED ORAL at 17:19

## 2017-02-05 RX ADMIN — METFORMIN HYDROCHLORIDE 1000 MG: 500 TABLET ORAL at 17:19

## 2017-02-05 RX ADMIN — ASPIRIN 81 MG 81 MG: 81 TABLET ORAL at 08:01

## 2017-02-05 RX ADMIN — ENALAPRIL MALEATE 20 MG: 10 TABLET ORAL at 19:38

## 2017-02-05 RX ADMIN — OXYCODONE HYDROCHLORIDE 10 MG: 5 TABLET ORAL at 13:14

## 2017-02-05 RX ADMIN — INSULIN ASPART 1 UNITS: 100 INJECTION, SOLUTION INTRAVENOUS; SUBCUTANEOUS at 17:18

## 2017-02-05 RX ADMIN — INSULIN ASPART 1 UNITS: 100 INJECTION, SOLUTION INTRAVENOUS; SUBCUTANEOUS at 09:29

## 2017-02-05 RX ADMIN — LOPERAMIDE HYDROCHLORIDE 2 MG: 2 CAPSULE ORAL at 15:12

## 2017-02-05 RX ADMIN — OXYCODONE HYDROCHLORIDE 5 MG: 5 TABLET ORAL at 19:53

## 2017-02-05 NOTE — PLAN OF CARE
Problem: Goal Outcome Summary  Goal: Goal Outcome Summary  A/O, blood pressure elevated iv hydralazine given, Pain 6/10 oxycodone given for pain. Cervical collar on at all times.  Up with assist of 1  To  The bathroom voiding adequate.  difficulty swallowing speech consult ordered.

## 2017-02-05 NOTE — PLAN OF CARE
Problem: Goal Outcome Summary  Goal: Goal Outcome Summary  Outcome: No Change  Pt up and walking in room, in hallway, and sitting in chair.  Lc, -cough, on room air, using IS upto 2000  +bs faint, -flatus, +belching, lbm was the 2/2. On soft diabetic diet, pt is having soreness in throat. Taking whole pills in applesauce or pudding. Pt is having some difficulty swallowing. Pt eating soft foods.  Had u/s of leg, -dvt. Pt made aware   and 172  Changed drsg and dc/d drain.   scds on legs, +radial and pp, strong dorsi/planter flexion. Weaker on L side as per baseline.  SL iv.   Taking oxy for pain.  5mg.

## 2017-02-05 NOTE — PROGRESS NOTES
02/05/17 1000   General Information   Onset Date 02/03/17   Start of Care Date 02/05/17   Referring Physician Dr. Mendoza   Patient Profile Review/OT: Additional Occupational Profile Info See Profile for full history and prior level of function   Patient/Family Goals Statement To eat comfortably   Swallowing Evaluation Bedside swallow evaluation   Behavorial Observations WFL (within functional limits)   Mode of current nutrition Oral diet   Type of oral diet Regular;Thin liquid   Respiratory Status Room air   Comments Patient s/t anterior cervical fusion 2/3/17. She is wearing a c-collar and c/o mild-moderate pain while swallowing. She denies difficulty swallowing prior to surgery, but has been losing weight d/t nausea and reduced appetite.   Clinical Swallow Evaluation   Oral Musculature generally intact   Structural Abnormalities none present   Dentition present and adequate  (missing upper and lower teeth)   Mucosal Quality good   Oral Labial Strength and Mobility WFL   Lingual Strength and Mobility WFL   Velar Elevation intact   Buccal Strength and Mobility intact   Laryngeal Function Swallow;Voicing initiated   Oral Musculature Comments Unable to assess hyolaryngeal excursion d/t pt wearing c-collar. Otherwise WFL.   Additional Documentation Yes   Clinical Swallow Eval: Thin Liquid Texture Trial   Mode of Presentation, Thin Liquids straw;self-fed   Volume of Liquid or Food Presented 5-6 sips   Oral Phase of Swallow WFL   Pharyngeal Phase of Swallow intact   Diagnostic Statement Unremarkable   Clinical Swallow Eval: Puree Solid Texture Trial   Mode of Presentation, Puree self-fed;spoon   Volume of Puree Presented 2 bites of pudding   Oral Phase, Puree WFL;other (see comments)  (prolonged oral prep)   Pharyngeal Phase, Puree intact   Diagnostic Statement Prolonged oral management, which is likely compensatory.   Clinical Swallow Eval: Solid Food Texture Trial   Mode of Presentation, Solid self-fed   Volume of  Solid Food Presented 2 bites muffin   Oral Phase, Solid WFL;other (see comments)  (Prolonged mastication)   Pharyngeal Phase, Solid intact   Diagnostic Statement Prolonged mastication which is likely compensatory.   Swallow Eval: Clinical Impressions   Skilled Criteria for Therapy Intervention No problems identified which require skilled intervention   Functional Assessment Scale (FAS) 6   Treatment Diagnosis Minimal oropharyngeal dysphagia    Diet texture recommendations Regular diet;Thin liquids   Recommended Feeding/Eating Techniques alternate between small bites and sips of food/liquid;small sips/bites   Anticipated Discharge Disposition home aw/ assist   Risks and Benefits of Treatment have been explained. Yes   Patient, family and/or staff in agreement with Plan of Care Yes   Clinical Impression Comments Patient seen for clinical swallow evaluation per DO orders. Patient presents with minimal oropharyngeal dysphagia s/t pharyngeal pain (3-4 out of 10) and perhaps reduced hyolaryngeal excursion from c-collar. No evidence of pharyngeal dysfunction, such as multiple swallows, coughing/choking, or wet voice. Prolonged oral management/mastication is likely compensatory in nature. Educated patient on advancing her diet at home as tolerating, providing written information as well. RECOMMENDATIONS: Continue regular diet with thin liquids with patient using her discretion to choose softer, most tolerable foods. Educated patient on timing meals with pain medication to maximize comfort with swallowing. No further intervention is indicated at this time. If patient's swallow function does not continue to improve, she may benefit from outpatient evaluation. Please re-consult as needed.   Total Evaluation Time   Total Evaluation Time (Minutes) 20

## 2017-02-05 NOTE — PROGRESS NOTES
"[facility]  Hospitalist Progress Note  Juan Manuel Mendoza, DO 02/05/2017    Reason for Stay (Diagnosis): Cervical spine surgery         Assessment and Plan:      Summary of Stay: Rupinder Uriostegui is a 71 year old female admitted on 2/3/2017 with cervical spine surgery.    Problem List:   1. Multi level cervical spine decompression/fusion.  Pain meds PRN.  Therapy.  Use incentive spirometry.  2. HTN.  Start HCTZ 25 mg/day.  Continue Coreg, Enalapril, Verapamil.  Hydralazine PRN.  3. Diabetes Mellitus.  Januvia, Metformin, Novolog SSI.  4. GERD.  Omeprazole.  DVT Prophylaxis: Pneumatic Compression Devices  Code Status: Full Code  Discharge Dispo: Per Spine surgery  Estimated Disch Date / # of Days until Disch: Per Spine Surgery.        Interval History (Subjective):      Having neck pain.  Having diarrhea which is usual for patient.  No CP, SOB, F/C, N/V.                  Physical Exam:      Last Vital Signs:  /60 mmHg  Pulse 74  Temp(Src) 96.5  F (35.8  C) (Oral)  Resp 18  Ht 1.676 m (5' 6\")  Wt 88.905 kg (196 lb)  BMI 31.65 kg/m2  SpO2 94%    Gen:  NAD, A&Ox3.  Eyes:  PERRL, sclera anicteric.  OP:  MMM, no lesions.  Neck:  Neck in brace.  CV:  Regular, no murmurs.  Lung:  CTA b/l, normal effort.  Ab:  +BS, soft.  Skin:  Warm, dry to touch.  No rash.  Ext:  No pitting edema LE b/l.           Medications:      All current medications were reviewed with changes reflected in problem list.         Data:      All new lab and imaging data was reviewed.   Labs:    Recent Labs  Lab 02/05/17  0659   *   POTASSIUM 4.3   CHLORIDE 114*   CO2 19*   ANIONGAP 12   *   BUN 23   CR 0.97   GFRESTIMATED 57*   GFRESTBLACK 68   GUSTABO 8.7       Recent Labs  Lab 02/05/17  0659   WBC 14.5*   HGB 9.4*   HCT 28.2*   MCV 92         Imaging:   Recent Results (from the past 24 hour(s))   US Lower Extremity Venous Duplex Left    Narrative    ULTRASOUND VENOUS LOWER EXTREMITY UNILATERAL LEFT  2/4/2017 " 6:40 PM     HISTORY: Left leg pain.    COMPARISON: None.    TECHNIQUE: Ultrasound gray scale, Color Doppler flow, and spectral  Doppler waveform analysis performed.    FINDINGS: The left common femoral, superficial femoral, popliteal and  posterior tibial veins are patent and fully compressible and  demonstrate normal venous Doppler flow. The visualized greater  saphenous vein is negative for thrombus.      Impression    IMPRESSION: No DVT demonstrated.     KATE OCASIO MD

## 2017-02-05 NOTE — DISCHARGE SUMMARY
This patient was admitted for following surgery for following reasons:    DATE OF SURGERY:  2/3/2017.          PREOPERATIVE DIAGNOSIS:  Cervical stenosis C4-5, 5-6, 6-7 with cervical myelopathy.        POSTOPERATIVE DIAGNOSIS:  Same.        SURGEON:  Jian Caldera MD.        FIRST ASSISTANT:  Monico Molina CNP.         PROCEDURES PERFORMED:    1.  Anterior cervical decompression fusion C4-5, 5-6, 6-7.    2.  L&K Bi[employer] PEEK cages 8 mm at C4-5; 9 mm at C5-6 and C6-7.    3.  Anterior instrumentation C4 through C7, LNK Biomet plate screws.        ESTIMATED BLOOD LOSS:  20 cc.        TIME:  Approximately 2-1/2 hours.        COMPLICATIONS:  None.        INDICATIONS:  Rupinder Uriostegui is a 71-year-old woman with a 6-month history of progressive myelopathy paresis electric shocks with a severe cord compression with myelomalacia evident on the preoperative MRI scan.  Given this situation urgent anterior cervical decompression fusion was recommended, the nature of surgery, risks and alternatives were discussed, the patient opted to proceed.  The entire procedure was carried out under SSEP and MEP monitoring nature, procedure, risks and alternatives were discussed.          Postop she did well.   Neuro improved postop.  Due to swallowing difficulties required a few days of extra hospitalization.  There is also some shoulder pain that kept her in the hospital.   By POD #3 she felt well enough to go home.  DC instruction was given.

## 2017-02-05 NOTE — PLAN OF CARE
Problem: Goal Outcome Summary  Goal: Goal Outcome Summary  Outcome: Improving  A&Ox4, /61 this am, recheck 152/60, new order for HCTZ 25mg daily given.  Afebrile.  LS clear, sats 91-94% RA.  Able to swallow meds with pudding/apple sauce, tolerating mod cho diet, fair appetite. Seen by speech for swallow eval.  BGs 168 and 179 covered per sliding scale. Pain managed with prn oxycodone and scheduled tylenol.   Anterior neck dressing changed, no drainage, incision with steri strips, neck brace on at all times.  Ambulating with SBA/walker, walked in halls and up to bathroom, up in chair for meals.  Voiding adequate, multiple loose stools today, patient reports she has had since her pain started at home.  Dr. Mendoza updated, received order for one time dose Imodium.  Son visiting.  Continue with plan of care.

## 2017-02-05 NOTE — PROGRESS NOTES
Vitals intact.   Ambulating actively.  Still some sore throat and swallowing difficulty.   Keep in hospital til tomorrow and then discharge.  Abdomen soft.  Will discharge for tomorrow.

## 2017-02-05 NOTE — PLAN OF CARE
Problem: Goal Outcome Summary  Goal: Goal Outcome Summary  PT:    Surgeon Discharge Plan: Home    Current Functional Status: Able to complete sit<>supine with SBA and cuing for technique. Pt able to complete sit<>stand with SBA. Pt ambulates 140' with use of FWW and SBA.     Barriers to Plan/Home: Needs to be able to complete stairs at PM session-anticipate pt will be successful.

## 2017-02-05 NOTE — PLAN OF CARE
Problem: Goal Outcome Summary  Goal: Goal Outcome Summary  PT:    Surgeon Discharge Plan: Home, likely tommorrow    Current Functional Status: Pt able to complete bed mobility and sit<>stand with SBA. Pt ambulates 200' with use of FWW and SBA. Pt able to go up/down four steps with use of B handrails and SBA. Brace donned throughout session.     Barriers to Plan/Home: None.

## 2017-02-06 ENCOUNTER — APPOINTMENT (OUTPATIENT)
Dept: PHYSICAL THERAPY | Facility: CLINIC | Age: 72
DRG: 472 | End: 2017-02-06
Attending: ORTHOPAEDIC SURGERY
Payer: MEDICARE

## 2017-02-06 VITALS
RESPIRATION RATE: 16 BRPM | TEMPERATURE: 97.7 F | DIASTOLIC BLOOD PRESSURE: 88 MMHG | BODY MASS INDEX: 31.5 KG/M2 | WEIGHT: 196 LBS | OXYGEN SATURATION: 93 % | SYSTOLIC BLOOD PRESSURE: 172 MMHG | HEIGHT: 66 IN | HEART RATE: 74 BPM

## 2017-02-06 LAB
ANION GAP SERPL CALCULATED.3IONS-SCNC: 8 MMOL/L (ref 3–14)
BUN SERPL-MCNC: 21 MG/DL (ref 7–30)
CALCIUM SERPL-MCNC: 9.3 MG/DL (ref 8.5–10.1)
CHLORIDE SERPL-SCNC: 110 MMOL/L (ref 94–109)
CO2 SERPL-SCNC: 26 MMOL/L (ref 20–32)
CREAT SERPL-MCNC: 0.92 MG/DL (ref 0.52–1.04)
GFR SERPL CREATININE-BSD FRML MDRD: 60 ML/MIN/1.7M2
GLUCOSE BLDC GLUCOMTR-MCNC: 109 MG/DL (ref 70–99)
GLUCOSE BLDC GLUCOMTR-MCNC: 122 MG/DL (ref 70–99)
GLUCOSE BLDC GLUCOMTR-MCNC: 124 MG/DL (ref 70–99)
GLUCOSE SERPL-MCNC: 108 MG/DL (ref 70–99)
POTASSIUM SERPL-SCNC: 4.3 MMOL/L (ref 3.4–5.3)
SODIUM SERPL-SCNC: 144 MMOL/L (ref 133–144)

## 2017-02-06 PROCEDURE — 25000132 ZZH RX MED GY IP 250 OP 250 PS 637: Mod: GY | Performed by: ORTHOPAEDIC SURGERY

## 2017-02-06 PROCEDURE — 99232 SBSQ HOSP IP/OBS MODERATE 35: CPT | Performed by: INTERNAL MEDICINE

## 2017-02-06 PROCEDURE — 36415 COLL VENOUS BLD VENIPUNCTURE: CPT | Performed by: INTERNAL MEDICINE

## 2017-02-06 PROCEDURE — 25000128 H RX IP 250 OP 636: Performed by: INTERNAL MEDICINE

## 2017-02-06 PROCEDURE — 97116 GAIT TRAINING THERAPY: CPT | Mod: GP | Performed by: PHYSICAL THERAPIST

## 2017-02-06 PROCEDURE — A9270 NON-COVERED ITEM OR SERVICE: HCPCS | Mod: GY | Performed by: INTERNAL MEDICINE

## 2017-02-06 PROCEDURE — 25000132 ZZH RX MED GY IP 250 OP 250 PS 637: Mod: GY | Performed by: INTERNAL MEDICINE

## 2017-02-06 PROCEDURE — 80048 BASIC METABOLIC PNL TOTAL CA: CPT | Performed by: INTERNAL MEDICINE

## 2017-02-06 PROCEDURE — 97530 THERAPEUTIC ACTIVITIES: CPT | Mod: GP | Performed by: PHYSICAL THERAPIST

## 2017-02-06 PROCEDURE — A9270 NON-COVERED ITEM OR SERVICE: HCPCS | Mod: GY | Performed by: ORTHOPAEDIC SURGERY

## 2017-02-06 PROCEDURE — 00000146 ZZHCL STATISTIC GLUCOSE BY METER IP

## 2017-02-06 PROCEDURE — 40000193 ZZH STATISTIC PT WARD VISIT: Performed by: PHYSICAL THERAPIST

## 2017-02-06 RX ORDER — LABETALOL HYDROCHLORIDE 5 MG/ML
20 INJECTION, SOLUTION INTRAVENOUS
Status: DISCONTINUED | OUTPATIENT
Start: 2017-02-06 | End: 2017-02-06 | Stop reason: HOSPADM

## 2017-02-06 RX ADMIN — HYDROCHLOROTHIAZIDE 25 MG: 12.5 CAPSULE ORAL at 08:08

## 2017-02-06 RX ADMIN — OXYCODONE HYDROCHLORIDE 10 MG: 5 TABLET ORAL at 01:38

## 2017-02-06 RX ADMIN — METFORMIN HYDROCHLORIDE 1000 MG: 500 TABLET ORAL at 08:08

## 2017-02-06 RX ADMIN — ACETAMINOPHEN 975 MG: 325 TABLET, FILM COATED ORAL at 01:39

## 2017-02-06 RX ADMIN — OMEPRAZOLE 40 MG: 20 CAPSULE, DELAYED RELEASE ORAL at 08:08

## 2017-02-06 RX ADMIN — OXYCODONE HYDROCHLORIDE 10 MG: 5 TABLET ORAL at 13:00

## 2017-02-06 RX ADMIN — CARVEDILOL 37.5 MG: 12.5 TABLET, FILM COATED ORAL at 12:20

## 2017-02-06 RX ADMIN — Medication 20 MG: at 12:21

## 2017-02-06 RX ADMIN — ACETAMINOPHEN 975 MG: 325 TABLET, FILM COATED ORAL at 12:21

## 2017-02-06 RX ADMIN — OXYCODONE HYDROCHLORIDE 5 MG: 5 TABLET ORAL at 09:00

## 2017-02-06 RX ADMIN — ENALAPRIL MALEATE 20 MG: 10 TABLET ORAL at 08:08

## 2017-02-06 RX ADMIN — SITAGLIPTIN 50 MG: 50 TABLET, FILM COATED ORAL at 08:08

## 2017-02-06 RX ADMIN — ASPIRIN 81 MG 81 MG: 81 TABLET ORAL at 08:08

## 2017-02-06 NOTE — DISCHARGE INSTRUCTIONS
Call surgeon if any of these issues occur:  1) Increased/persistent redness,bleeding, localized warmth and swelling,drainage (yellow/clear/odorous) or tenderness at or incision site. Or incision pulling apart.  2) Increased pain not controlled with oral pain medications.sedation  3) Persistent headache, dizziness, lightheaded   4)Persistent constipation despite taking OTC stool softners as directed   5) calf pain/swollen/hard/warm area,swelling chest pain or shortness of breath  6)increased/persistent numbness or tingling in arms or legs, weakness in extremities or falls  7) Generalized feeling of illness.  8) persistent fever, chills, sweats. Temp >101  9) trouble voiding, incontinence of bowel and/or bladder  10), Difficulty swallowing liquids or foods, feel like things stick in throat. Or coughing with food or drink  11) if unable to wake call 911  Other instructions:  1) change dressing daily or more often for moist drainage,  Change dressing daily or more often if drainage ( call md if doingi through more than one dressing a day)Wash hands before and after changing dressing. Avoid touching incision  2) take teds off 3 times a day for 20 minutes  3)no heavy lifting, nothing more then 6-10lbs. No bending, lifiting or twisting, no sitting for long periods of time. Follow physical therapy restrictions and exercises-Slowly increase  activity  4) avoid sitting or laying in one position too long, walk as tolerated, log roll  5) wear brace at all times, can be removed for showering and for skin inspection. inspect skin under brace daily and call md if sore area starts.  6) take an over the counter stool softener as directed while on narcotics to prevent constipation or to stay regular. Take a suppository or a laxative if no bowel movement in 2 days despite taking stool softener    Diet instructions:  Sit upright in chair for meals or at least 90 degree angle  Moisten mouth before eating or taking pills  Eat slowly and  chew food well, alternate food and drink  Cut food up small  Eat soft foods, easily swallowed food, small frequent meals      Call to make follow up appointment  1- [phone] with dr tawana Nguyễn    Take blood pressure daily and if BP is consistently >140/90 call your primary care md

## 2017-02-06 NOTE — PLAN OF CARE
Problem: Goal Outcome Summary  Goal: Goal Outcome Summary  Outcome: Adequate for Discharge Date Met:  02/06/17  Day RN  Bp elevated today, prn and scheduled meds given. No temps. CMS+ incision and dressing is CDI collar in place at all times, removed briefly for skin inspection WDL. Brace fitting better today, orthotics stated no need for changes. Up with SBA and walker moving well and following spine precautions with little reminders  Voiding well. Bm, yesterday.   swallowing is better taking her time no coughing.   Dc to home with son. All dc instructions follow up care and meds were all reviewed prior to dc.filled oxycodone given to patient at dc.

## 2017-02-06 NOTE — PLAN OF CARE
Problem: Goal Outcome Summary  Goal: Goal Outcome Summary  Outcome: Improving  pts bp slightly elevated. 170/65 and then 164/70. Apresoline available for anything >180.   LC -cough, on room air, using IS upto 1750  +bs, +flatus, had diarrhea today on day shift, immodium given and held stool softner. Will collect stool. Pt is prone and has hx of diarrhea.   On diabetic, soft diet. Taking whole pills in applesauce or pudding to help with swallowing. Speech seen pt today.  [place] collar in place.   Pt ambulated in the halls, sat up in chair, walking into bathroom  Voiding.   Taking oxy for pain x 1, bs 148

## 2017-02-06 NOTE — PROGRESS NOTES
"[facility]  Hospitalist Consult Progress Note  Jimy Ryan MD 02/06/2017    Reason for Stay (Diagnosis): cervical spine decompression/fusion         Assessment and Plan:      Summary of Stay: Rupinder Uriostegui is a 71 year old female w/ hx of HTN, DM2, GERD admitted on 2/3/2017 with cervical spine surgery (c-spine decompression/fusion).  We were consulted to help manage medical co-morbidities while here.  BP has been high but it does seem pain/stress/anxiety may be driving this as normally she states she has excellent control.  Update: higher bp likely also related to missed coreg - she was to bring her home supply of 80 mg daily coreg-24 hour tabs but hasn't gotten this.  Will start BID formulation of 37.5 mg BID.  .     Problem List:    1. Multi level cervical spine decompression/fusion.  Pain control, DVT prophylaxis, activity and disposition as per primary team. Therapy.  Use incentive spirometry.    2. HTN.  Started HCTZ 25 mg/day while here.  Continue Coreg, Enalapril, Verapamil.  Hydralazine PRN while here. Certainly somewhat higher due to pain, would follow up closely as an outpatient.  She describes excellent long-term control so I don't plan to increase her bp med regimen at discharge if avoidable as as her pain improves she could actually drop low.  Update: see above re: missed coreg.    3. Diabetes Mellitus.  Januvia, Metformin, Novolog SSI.    4. GERD.  Omeprazole.    DVT Prophylaxis: Pneumatic Compression Devices  Code Status: Full Code  Discharge Dispo: Per Spine surgery  Estimated Disch Date / # of Days until Disch: Per Spine Surgery.        Interval History (Subjective):      I assumed consultative care today  bp control variable though overall high  She describes excellent long-term control                      Physical Exam:      Last Vital Signs:  /80 mmHg  Pulse 74  Temp(Src) 97.2  F (36.2  C) (Oral)  Resp 16  Ht 1.676 m (5' 6\")  Wt 88.905 kg (196 lb)  BMI " 31.65 kg/m2  SpO2 94%      Intake/Output Summary (Last 24 hours) at 02/06/17 1106  Last data filed at 02/06/17 0856   Gross per 24 hour   Intake    860 ml   Output    400 ml   Net    460 ml       General: Alert, awake, somewhat anxious.  HEENT: NC/AT, eyes anicteric, external occular movements intact, face symmetric.  Neck c-collar in place.   Cardiac: RRR, S1, S2.  No murmurs appreciated.  Pulmonary: Normal chest rise, normal work of breathing.  Lungs CTA BL  Abdomen: soft, non-tender, non-distended.  Bowel Sounds Present.  No guarding.  Extremities: no deformities.  Warm, well perfused.  Skin: no rashes or lesions noted.  Warm and Dry.  Neuro: No focal deficits noted.  Speech clear.  Coordination and strength grossly normal.  Psych: Appropriate affect.         Medications:      All current medications were reviewed with changes reflected in problem list.         Data:      All new lab and imaging data was reviewed.   Labs:  Last Basic Metabolic Panel:  NA      144   2/6/2017   POTASSIUM      4.3   2/6/2017  CHLORIDE      110   2/6/2017  GUSTABO      9.3   2/6/2017  CO2       26   2/6/2017  BUN       21   2/6/2017  CR     0.92   2/6/2017  GLC      108   2/6/2017    Imaging:   Recent Results (from the past 48 hour(s))   US Lower Extremity Venous Duplex Left    Narrative    ULTRASOUND VENOUS LOWER EXTREMITY UNILATERAL LEFT  2/4/2017 6:40 PM     HISTORY: Left leg pain.    COMPARISON: None.    TECHNIQUE: Ultrasound gray scale, Color Doppler flow, and spectral  Doppler waveform analysis performed.    FINDINGS: The left common femoral, superficial femoral, popliteal and  posterior tibial veins are patent and fully compressible and  demonstrate normal venous Doppler flow. The visualized greater  saphenous vein is negative for thrombus.      Impression    IMPRESSION: No DVT demonstrated.     MD Jimy ABDALLA MD.

## 2017-02-06 NOTE — PLAN OF CARE
Problem: Goal Outcome Summary  Goal: Goal Outcome Summary  Outcome: Improving  Vss except for elevated sbps-not met parameters for prn medication. Afebrile. Lungs clr-room air mid 90s. Is done. +bs/+gas, no nausea. Tolerating diet. Last bm 02/05/17. Voiding. Saline locked. Drsg-cdi. Cms wnl except for baseline numbness to bilateral feet. No drain. Pain controlled with Oxycodone. Cervical collar on at all times. Tolerating ice and repositioning. Orthotics order placed and will be called this morning to come refit the cervical collar, prior to pt discharging home today. No other significant issues noted overnight.

## 2017-02-06 NOTE — PLAN OF CARE
Problem: Goal Outcome Summary  Goal: Goal Outcome Summary  Physical Therapy Discharge Summary    Reason for therapy discharge:    All goals and outcomes met, no further needs identified.    Progress towards therapy goal(s). See goals on Care Plan in [facility] electronic health record for goal details.  Goals met    Therapy recommendation(s):    No further therapy is recommended.

## 2017-02-06 NOTE — PROGRESS NOTES
Was called to floor to adjust orthosis.  Orthosis not from our clinic.  It is an [place].  Patient complaint of discomfort on the posterior neck.  I checked her skin but no redness.  The fit looks good.  I advised patient to wear orthosis snug and to have her skin checked if pain persists.  I did not provide anything.

## 2021-04-12 NOTE — OR NURSING
Patient anxious and difficult to evaluate, calling out constantly.  Son brought in to bedside.     Subjective   Patient ID: Emily is a 46 year old female.    HPI    The patient is a 46 year old female presenting for follow-up management of DM, Diabetic Nephropathy, HLD, Depression, and Low Back Pain.    Diabetes Mellitus / Diabetic Nephropathy:  Most recent labs on 12/12/2020 indicate a Hgb A1c of 5.7% and fasting BS of 97 mg/dL.  Most recent labs on 12/12/2020 indicated normal kidney function.  Today in office, the patient's Hgb A1c was 5.9%.  Patient presently manages the condition with diet and exercise.  Patient denies polydipsia, polyuria, visual disturbances and vomiting. Evaluation to date has included: fasting blood sugar, fasting lipid panel, hemoglobin A1C and microalbuminuria.    Current treatment: Patient is compliant with treatment; patient denies side effect and complication. No recent interventions.   Last dilated eye exam: Not up-to-date--  Associated Symptoms: no weight gain; no weight loss; no dizziness; no sweats; no headaches; no confusion    Hyperlipidemia:  Most recent lipid panel done on 12/12/2020 indicated elevated triglycerides of 159 mg/dL and decreased HDL of 29 mg/dL; LDL and total cholesterol were wnl.  Patient is currently taking atorvastatin 20 mg PO every day.  Patient has been taking cholesterol medication regularly and is not having muscle aches or other side effect.    Depression:  Per the patient, the condition is stable.  Most recent PHQ 9 score was 12 points on 1/16/2020.  Patient is presently managing the condition with Paxil 20 mg PO every day.  Patient has been adherent to treatment regimen and is not experiencing any new complaint or side effect.    Low Back Pain:  The patient previously presented to our office on 12/12/2020 complaining of low back pain. The patient was referred for physical therapy and encouraged to take Motrin for pain. Today, the patient reports that her low back pain has resolved and notes that physical therapy helped her pain greatly.    PAST  MEDICAL HISTORY:  History reviewed and updated.  Patient  has a past medical history of Bicornate uterus, Contact dermatitis, Depressive disorder, Diabetes (CMS/[facility]), E. coli UTI, Elbow fracture, Fibroid, STARR (generalized anxiety disorder) (1/17/2020), GERD (gastroesophageal reflux disease), Microalbuminuric diabetic nephropathy (CMS/[facility]), Mixed hyperlipidemia, Muscle spasms of neck (7/2/2019), NAFL (nonalcoholic fatty liver) (12/11/2020), Neck pain on right side (7/2/2019), Squamous blepharitis, Urinary incontinence, Vaginal delivery (1997, 2001, 2003), and Vitamin D deficiency. She also has no past medical history of Abnormal uterine bleeding, Adverse effect of anesthesia, Amenorrhea, Anemia, Blood disorder, Blood transfusion without reported diagnosis, Breast disorder, Clotting disorder (CMS/[facility]), Coronary artery disease, Endometriosis of cervix, Essential (primary) hypertension, Female infertility, Genital warts, Heart murmur, Herpes simplex virus infection, History of dysmenorrhea, HIV disease (CMS/[facility]), Lupus (CMS/[facility]), Malignant neoplasm (CMS/[facility]), Osteoporosis, PID (acute pelvic inflammatory disease), Postpartum depression, Rh incompatibility, Seizures (CMS/[facility]), Sickle cell anemia (CMS/[facility]), STD (sexually transmitted disease), Substance abuse (CMS/[facility]), or Thyroid disease.    PAST SURGICAL HISTORY:  History reviewed and updated.  Patient  has a past surgical history that includes Gallbladder surgery (2013); Elbow surgery (2012); Tubal ligation (2003); Cholecystectomy; and Sacrospinous ligament fixation (08/08/2019).    FAMILY HISTORY:  History reviewed and updated.  Family History   Problem Relation Age of Onset   • Diabetes Mother    • Hypertension Mother    • Patient is unaware of any medical problems Father    • Diabetes Brother        SOCIAL HISTORY: History reviewed and updated.  Tobacco Use:  reports that she has never smoked. She has never used smokeless tobacco.  Alcohol Use:  reports no history of  alcohol use.  Drug Use:  reports no history of drug use.    ALLERGIES:  ALLERGIES:  Patient has no known allergies.    MEDICATIONS:  Current Outpatient Medications   Medication   • atorvastatin (LIPITOR) 40 MG tablet   • PARoxetine (PAXIL) 20 MG tablet   • ibuprofen (MOTRIN) 600 MG tablet   • Spacer/Aero-Holding Chambers (OptiChamber Sil) Misc   • albuterol 108 (90 Base) MCG/ACT inhaler   • fluticasone (FLONASE) 50 MCG/ACT nasal spray   • MICROLET LANCETS Misc     No current facility-administered medications for this visit.       Patient Care Team:  Mirtha Sutherland MD as PCP - General  Jose Feliciano OD as Optometry (Optometry)  Horace Barros MD as Obstetrics & Gynecology (Obstetrics & Gynecology)  Augustin Valdez MD as Dermatology (Dermatology)  Uzair Prakash MD as Referring Provider (Ophthalmology)    Review of Systems   Constitutional: Negative.    HENT: Negative.    Eyes: Negative.    Respiratory: Negative.    Cardiovascular: Negative.    Gastrointestinal: Negative.    Endocrine: Negative.    Genitourinary: Negative.    Musculoskeletal: Negative.    Skin: Negative.    Allergic/Immunologic: Negative.    Neurological: Negative.    Hematological: Negative.    Psychiatric/Behavioral: Negative.        Objective     Visit Vitals  /71   Pulse 71   Temp 97.6 °F (36.4 °C) (Temporal)   Ht 5' 3\" (1.6 m)   Wt 77.9 kg (171 lb 11.8 oz)   LMP 09/29/2020   SpO2 100%   BMI 30.42 kg/m²       Physical Exam  Vitals and nursing note reviewed.   Constitutional:       Appearance: Normal appearance.   HENT:      Head: Normocephalic and atraumatic.      Right Ear: Tympanic membrane, ear canal and external ear normal.      Left Ear: Tympanic membrane, ear canal and external ear normal.   Eyes:      General: No scleral icterus.     Extraocular Movements: Extraocular movements intact.      Conjunctiva/sclera: Conjunctivae normal.      Pupils: Pupils are equal, round, and reactive to light.   Cardiovascular:      Rate and Rhythm:  Normal rate and regular rhythm.      Pulses: Normal pulses.           Dorsalis pedis pulses are 2+ on the right side and 2+ on the left side.        Posterior tibial pulses are 2+ on the right side and 2+ on the left side.      Heart sounds: Normal heart sounds.   Pulmonary:      Effort: Pulmonary effort is normal.      Breath sounds: Normal breath sounds.   Abdominal:      General: Bowel sounds are normal.      Palpations: Abdomen is soft.      Tenderness: There is no abdominal tenderness.   Musculoskeletal:      Cervical back: Normal range of motion and neck supple.      Lumbar back: No bony tenderness. Positive right straight leg raise test (very mild) and positive left straight leg raise test (very mild).      Right lower leg: No edema.      Left lower leg: No edema.   Lymphadenopathy:      Cervical: No cervical adenopathy.   Skin:     General: Skin is warm.   Neurological:      General: No focal deficit present.      Mental Status: She is alert.   Psychiatric:         Mood and Affect: Mood normal.         Behavior: Behavior normal.         Thought Content: Thought content normal.         Judgment: Judgment normal.           Recent PHQ 2/9 Score    PHQ 2:  Date Adult PHQ 2 Score Adult PHQ 2 Interpretation   4/13/2021 0 No further screening needed          Results for orders placed or performed in visit on 04/13/21   POCT GLYCOHEMOGLOBIN ANALYZER   Result Value Ref Range    Hemoglobin A1C, POC 5.9 %     Plan    Assessment     Problem List Items Addressed This Visit        Behavioral    Mild depression (CMS/HCC)     Monitor: Condition is stable and well controlled.  Evaluation: No diagnostic tests needed today.  Assessment/Treatment:  Suicidal Ideation: not present.   Continue present management with Paxil 20 mg PO every day.  Signs & dangers associated with depression including suicidal ideations discussed  Prompt follow-up recommended if any symptoms develop or worsen.    Patient education completed on starting  or maintaining an exercise program and following a healthy diet.  Patient expresses understanding of the plan; all questions were answered.         Relevant Medications    PARoxetine (PAXIL) 20 MG tablet       Musculoskeletal    Acute right-sided low back pain with right-sided sciatica     Monitor: Condition is stable and well controlled. The patient reports that she completed PT for her low back pain and notes that her pain has since resolved.  Evaluation: During the PE, the patient had no TTP of the lumbar spinous process and had very mild L SLR test.  Assessment/Treatment:  Continue with present treatment regimen as indicated.  Pt encouraged to remain active to ensure her back does not remain tense.  Pt encouraged to continue physical therapy at home to help with her low back pain.  Patient expresses understanding of the plan; all questions were answered.            Endocrine    Type 2 diabetes mellitus with other specified complication (CMS/[facility]) - Primary     Monitor: The patient's diabetes is stable and very well controlled.  Hgb A1c today in office was 5.9%.  Evaluation: Labs ordered today in office, refer to orders.  Assessment/Treatment:  A1c Goal: Met  LDL Goal: to be reassessed pending current lab orders, refer to orders.  BP Goal: Met  Continue current treatment regimen with diet and exercise.  Regular aerobic exercise.  Discussed foot care.  Reminded to get yearly retinal exam.  Recommended Follow-Up: Ophthalmology, refer to orders.  Reviewed and discussed the benefits of a low fat, low simple carbohydrate diet, at least 30 minutes of daily exercise, and weight loss to lower the risk of high blood sugar to maintain and improve diabetes.  Discussed risk of non-compliance with patient.  Discussed Exercise and Weight Loss Goals with patient  Condition will be reassessed at the next regular appointment.   Patient expresses understanding of the plan; all questions were answered.         Relevant Orders     POCT GLYCOHEMOGLOBIN ANALYZER (Completed)    COMPREHENSIVE METABOLIC PANEL (Completed)    LIPID PANEL WITH REFLEX (Completed)    MICROALBUMIN URINE RANDOM (Completed)    VITAMIN B12 (Completed)    SERVICE TO OPHTHALMOLOGY    Microalbuminuric diabetic nephropathy (CMS/HCC)     Monitor: Condition is stable and to be reassessed pending current lab orders, refer to orders.  Evaluation: Labs ordered today to reevaluate the condition, refer to orders.  Assessment/Treatment:  Continue with present treatment regimen as indicated.  Patient expresses understanding of the plan; all questions were answered.         Relevant Orders    COMPREHENSIVE METABOLIC PANEL (Completed)    LIPID PANEL WITH REFLEX (Completed)    Mixed hyperlipidemia     Monitor: The patient's cholesterol is to be reassessed pending current lab orders, refer to orders.  Evaluation: Labs ordered today, refer to orders.  Assessment/Treatment:  Continue with current treatment regimen with atorvastatin 20 mg PO every day.  Increase Your Intake on Whole Grains, Fresh Fruit and Vegetables.  Patient Education Completed on Starting or Maintaining Exercise Program  Patient Education Completed on Following a Low Fat/Low Sugar Diet  Condition will be reassessed per progress note  Patient expresses understanding of the plan; all questions were answered.    Cholesterol (mg/dL)   Date Value   12/12/2020 186     HDL (mg/dL)   Date Value   12/12/2020 29 (L)     Cholesterol/ HDL Ratio (no units)   Date Value   12/12/2020 6.4 (H)     Triglycerides (mg/dL)   Date Value   12/12/2020 159 (H)     LDL (mg/dL)   Date Value   12/12/2020 125            Relevant Orders    COMPREHENSIVE METABOLIC PANEL (Completed)    LIPID PANEL WITH REFLEX (Completed)       Other    Visit for screening mammogram     Monitor: The patient is to obtain a yearly mammogram.  Evaluation: Mammogram ordered, refer to orders.  Assessment/Treatment:  Discussed recommendations for targeted  population  Implication of screening discussed  Patient decided to proceed with screening  Mammogram ordered, refer to orders.         Relevant Orders    MAMMO SCREENING BILATERAL W [facility]          Medications Discontinued During This Encounter   Medication Reason   • PARoxetine (PAXIL) 20 MG tablet Reorder         Return in about 6 months (around 10/13/2021) for follow up DM and HLD.    Attestation  On 4/13/2021, IKrishan scribed the services personally performed by Dr. Mirtha Sutherland MD    I have reviewed and revised the note above. The documentation recorded by the scribe accurately reflects history obtained, actions preformed and decisions made by me.      Electronically signed by: Mirtha Sutherland MD 4/14/2021

## (undated) DEVICE — TAPE CLOTH ADHESIVE 3" LATEX FREE

## (undated) DEVICE — SPONGE COTTONOID 1/2X1/2" 80-1400

## (undated) DEVICE — SU VICRYL 4-0 PS-2 18" UND J496H

## (undated) DEVICE — GLOVE PROTEXIS POWDER FREE 6.5 ORTHOPEDIC 2D73ET65

## (undated) DEVICE — SOL WATER IRRIG 1000ML BOTTLE 2F7114

## (undated) DEVICE — DRAPE IOBAN INCISE 23X17" 6650EZ

## (undated) DEVICE — LINEN POUCH DBL 5427

## (undated) DEVICE — NDL SPINAL 18GA 3.5" 405184

## (undated) DEVICE — LINEN HALF SHEET 5512

## (undated) DEVICE — TUBING SUCTION 12"X1/4" N612

## (undated) DEVICE — NDL ANGIOCATH 14GA 1.25" 3068

## (undated) DEVICE — BONE WAX 2.5GM W31G

## (undated) DEVICE — GOWN LG DISP 9515

## (undated) DEVICE — GLOVE PROTEXIS POWDER FREE SMT 6.5  2D72PT65X

## (undated) DEVICE — GLOVE PROTEXIS POWDER FREE 7.5 ORTHOPEDIC 2D73ET75

## (undated) DEVICE — SUCTION TIP YANKAUER W/O VENT K86

## (undated) DEVICE — GLOVE PROTEXIS BLUE W/NEU-THERA 7.0  2D73EB70

## (undated) DEVICE — DECANTER BAG 2002S

## (undated) DEVICE — CATH TRAY FOLEY SURESTEP 16FR W/URINE MTR STATLK LF A303416A

## (undated) DEVICE — SOL NACL 0.9% IRRIG 1000ML BOTTLE 2F7124

## (undated) DEVICE — LINEN FULL SHEET 5511

## (undated) DEVICE — BAG CLEAR TRASH 1.3M 39X33" P4040C

## (undated) DEVICE — SPONGE KITTNER 30-101

## (undated) DEVICE — Device

## (undated) DEVICE — PREP DURAPREP 26ML APL 8630

## (undated) DEVICE — GLOVE PROTEXIS BLUE W/NEU-THERA 8.0  2D73EB80

## (undated) DEVICE — ESU ELEC NDL 1" COATED/INSULATED E1465

## (undated) DEVICE — BLADE KNIFE SURG 11 371111

## (undated) DEVICE — SOL NACL 0.9% 20ML VIAL 4888-20

## (undated) DEVICE — SPONGE COTTONOID 1/2X3" 80-1407

## (undated) DEVICE — SU VICRYL 2-0 CT-2 27" UND J269H

## (undated) DEVICE — PIN DISTRACTION ANCHOR FOR SCR 14MM MDS9091414

## (undated) DEVICE — ESU GROUND PAD ADULT W/CORD E7507

## (undated) DEVICE — SPONGE SURGIFOAM 01GM POWDER 1978

## (undated) DEVICE — DRAPE MAYO STAND 23X54 8337

## (undated) DEVICE — CATH IV ANGIO INTRO 12GA 382277

## (undated) DEVICE — GLOVE PROTEXIS POWDER FREE 8.0 ORTHOPEDIC 2D73ET80

## (undated) DEVICE — MIDAS REX DISSECTING TOOL  14MH30

## (undated) DEVICE — PACK SMALL SPINE RIDGES

## (undated) DEVICE — SYR 10ML LL W/O NDL

## (undated) DEVICE — SUCTION FRAZIER 12FR W/HANDLE K73

## (undated) DEVICE — SUCTION MANIFOLD NEPTUNE 2 SYS 4 PORT 0702-020-000

## (undated) DEVICE — PAD MAGNETIC INST HOLDER 16X10" DISP 200-16

## (undated) DEVICE — SYR 20ML LL W/O NDL

## (undated) DEVICE — DRAPE C-ARM 60X42" 1013

## (undated) RX ORDER — ACETAMINOPHEN 10 MG/ML
INJECTION, SOLUTION INTRAVENOUS
Status: DISPENSED
Start: 2017-02-03

## (undated) RX ORDER — CEFAZOLIN SODIUM 2 G/100ML
INJECTION, SOLUTION INTRAVENOUS
Status: DISPENSED
Start: 2017-02-03

## (undated) RX ORDER — FENTANYL CITRATE 50 UG/ML
INJECTION, SOLUTION INTRAMUSCULAR; INTRAVENOUS
Status: DISPENSED
Start: 2017-02-03

## (undated) RX ORDER — BUPIVACAINE HYDROCHLORIDE AND EPINEPHRINE 2.5; 5 MG/ML; UG/ML
INJECTION, SOLUTION EPIDURAL; INFILTRATION; INTRACAUDAL; PERINEURAL
Status: DISPENSED
Start: 2017-02-03

## (undated) RX ORDER — DEXAMETHASONE SODIUM PHOSPHATE 4 MG/ML
INJECTION, SOLUTION INTRA-ARTICULAR; INTRALESIONAL; INTRAMUSCULAR; INTRAVENOUS; SOFT TISSUE
Status: DISPENSED
Start: 2017-02-03

## (undated) RX ORDER — CEFAZOLIN SODIUM 1 G/3ML
INJECTION, POWDER, FOR SOLUTION INTRAMUSCULAR; INTRAVENOUS
Status: DISPENSED
Start: 2017-02-03